# Patient Record
Sex: FEMALE | Race: WHITE | NOT HISPANIC OR LATINO | Employment: UNEMPLOYED | URBAN - METROPOLITAN AREA
[De-identification: names, ages, dates, MRNs, and addresses within clinical notes are randomized per-mention and may not be internally consistent; named-entity substitution may affect disease eponyms.]

---

## 2017-09-18 ENCOUNTER — ALLSCRIPTS OFFICE VISIT (OUTPATIENT)
Dept: OTHER | Facility: OTHER | Age: 37
End: 2017-09-18

## 2017-09-18 DIAGNOSIS — N20.0 CALCULUS OF KIDNEY: ICD-10-CM

## 2017-09-18 LAB
BILIRUB UR QL STRIP: NORMAL
CLARITY UR: NORMAL
COLOR UR: YELLOW
GLUCOSE (HISTORICAL): NORMAL
HGB UR QL STRIP.AUTO: NORMAL
KETONES UR STRIP-MCNC: NORMAL MG/DL
LEUKOCYTE ESTERASE UR QL STRIP: NORMAL
NITRITE UR QL STRIP: NORMAL
PH UR STRIP.AUTO: 7 [PH]
PROT UR STRIP-MCNC: NORMAL MG/DL
SP GR UR STRIP.AUTO: 1.02
UROBILINOGEN UR QL STRIP.AUTO: 0.2

## 2017-09-27 ENCOUNTER — HOSPITAL ENCOUNTER (OUTPATIENT)
Dept: ULTRASOUND IMAGING | Facility: HOSPITAL | Age: 37
Discharge: HOME | End: 2017-09-27
Attending: UROLOGY
Payer: COMMERCIAL

## 2017-09-27 DIAGNOSIS — N20.0 CALCULUS OF KIDNEY: ICD-10-CM

## 2017-09-27 PROCEDURE — 76770 US EXAM ABDO BACK WALL COMP: CPT

## 2018-01-14 VITALS
SYSTOLIC BLOOD PRESSURE: 100 MMHG | HEIGHT: 64 IN | DIASTOLIC BLOOD PRESSURE: 62 MMHG | BODY MASS INDEX: 26.12 KG/M2 | WEIGHT: 153 LBS

## 2018-09-18 ENCOUNTER — HOSPITAL ENCOUNTER (OUTPATIENT)
Dept: RADIOLOGY | Facility: HOSPITAL | Age: 38
Discharge: HOME | End: 2018-09-18
Attending: UROLOGY
Payer: COMMERCIAL

## 2018-09-18 DIAGNOSIS — N20.0 CALCULUS OF KIDNEY: ICD-10-CM

## 2018-09-18 PROCEDURE — 74018 RADEX ABDOMEN 1 VIEW: CPT

## 2018-09-21 ENCOUNTER — OFFICE VISIT (OUTPATIENT)
Dept: UROLOGY | Facility: MEDICAL CENTER | Age: 38
End: 2018-09-21
Payer: COMMERCIAL

## 2018-09-21 VITALS
WEIGHT: 156 LBS | HEIGHT: 64 IN | DIASTOLIC BLOOD PRESSURE: 60 MMHG | SYSTOLIC BLOOD PRESSURE: 100 MMHG | BODY MASS INDEX: 26.63 KG/M2

## 2018-09-21 DIAGNOSIS — N20.0 CALCULUS OF KIDNEY: Primary | ICD-10-CM

## 2018-09-21 LAB
SL AMB  POCT GLUCOSE, UA: ABNORMAL
SL AMB LEUKOCYTE ESTERASE,UA: ABNORMAL
SL AMB POCT BILIRUBIN,UA: ABNORMAL
SL AMB POCT BLOOD,UA: ABNORMAL
SL AMB POCT CLARITY,UA: CLEAR
SL AMB POCT COLOR,UA: YELLOW
SL AMB POCT KETONES,UA: ABNORMAL
SL AMB POCT NITRITE,UA: ABNORMAL
SL AMB POCT PH,UA: 7
SL AMB POCT SPECIFIC GRAVITY,UA: 1.02
SL AMB POCT URINE PROTEIN: ABNORMAL
SL AMB POCT UROBILINOGEN: 0.2

## 2018-09-21 PROCEDURE — 81003 URINALYSIS AUTO W/O SCOPE: CPT | Performed by: UROLOGY

## 2018-09-21 PROCEDURE — 99213 OFFICE O/P EST LOW 20 MIN: CPT | Performed by: UROLOGY

## 2018-09-21 NOTE — LETTER
September 21, 2018     Raulito Clifford, [address] [facility] [address]    Patient: Barbara Guzman   YOB: 1980   Date of Visit: 9/21/2018       Dear Dr Jose Martin Perez: Thank you for referring Harini Sanchez to me for evaluation  Below are my notes for this consultation  If you have questions, please do not hesitate to call me  I look forward to following your patient along with you  Sincerely,        Jericho Haskins MD        CC: No Recipients  Jericho Haskins MD  9/21/2018 10:44 AM  Sign at close encounter  Assessment/Plan:    Calculus of kidney  KUB is reviewed on the [facility] system  No right renal or ureteral stones are seen  A small stone is seen in the left mid pole calyx  Treatment options were discussed and she has elected continued observation of this stone  We did review dietary measures for stone prevention and continued increased fluid intake was encouraged  She will return in 1 year  We will consider further radiologic studies depending on her symptoms at that time  Diagnoses and all orders for this visit:    Calculus of kidney  -     POCT urine dip auto non-scope          Subjective:      Patient ID: Barbara Guzman is a 44 y o  female  Chief complaint:  Kidney stones    HPI:  50-year-old female with a long history of kidney stones  She has had a extracorporeal shockwave lithotripsy and ureteroscopy in the past   She last passed a stone in 2014  She reports she is voiding well  There is no gross hematuria, dysuria or symptoms of infection  She has occasional episodes of intermittent right flank discomfort but nothing severe  She is not sure these are stone related  She returns today for follow-up          The following portions of the patient's history were reviewed and updated as appropriate: allergies, current medications, past family history, past medical history, past social history, past surgical history and problem list     Review of Systems Constitutional: Negative for activity change, appetite change, fatigue and fever  HENT: Negative  Eyes: Negative  Respiratory: Negative  Cardiovascular: Negative  Gastrointestinal: Negative for blood in stool, constipation, diarrhea and vomiting  Endocrine: Negative  Musculoskeletal: Negative  Skin: Negative  Allergic/Immunologic: Negative  Neurological: Negative  Hematological: Negative  Psychiatric/Behavioral: Negative  Objective:      /60 (BP Location: Left arm, Patient Position: Sitting)   Ht 5' 4" (1 626 m)   Wt 70 8 kg (156 lb)   BMI 26 78 kg/m²           Physical Exam   Constitutional: She is oriented to person, place, and time  She appears well-developed and well-nourished  HENT:   Head: Normocephalic and atraumatic  Eyes: Conjunctivae are normal    Neck: Neck supple  Cardiovascular: Normal rate  Pulmonary/Chest: Effort normal    Abdominal: Soft  She exhibits no distension and no mass  There is no tenderness  There is no rebound, no guarding and no CVA tenderness  Musculoskeletal: She exhibits no edema  No CVA tenderness   Neurological: She is alert and oriented to person, place, and time  Skin: Skin is warm and dry  Psychiatric: She has a normal mood and affect   Her behavior is normal  Judgment and thought content normal

## 2018-09-21 NOTE — PATIENT INSTRUCTIONS
Low Oxalate Diet   WHAT YOU NEED TO KNOW:   A low-oxalate diet is a meal plan that is low in oxalate  Oxalate is a chemical found in plant foods  You may need to eat foods that are low in oxalate to help clear kidney stones or prevent them from forming  People who have had kidney stones are at a higher risk of forming kidney stones again  The most common type of kidney stone is made up of crystals that contain calcium and oxalate  Your healthcare provider or dietitian may recommend that you limit oxalate if you get this type of kidney stone often  DISCHARGE INSTRUCTIONS:   Foods to include: Include the following foods that have a low to medium amount of oxalate    · Grains:      ¨ Egg noodles    ¨ Hamlet crackers    ¨ Pancakes and waffles    ¨ Cooked and dry cereals without nuts or bran    ¨ White or wild rice    ¨ White bread, cornbread, bagels, and white English muffins (medium oxalate)    ¨ Saltine or soda crackers and vanilla wafers (medium oxalate)    ¨ Brown rice, spaghetti, and other noodles and pastas (medium oxalate)    · Fruit:      ¨ Apples, bananas, grapes    ¨ Grapefruit and cranberries    ¨ Peaches, nectarines, apricots, and pears    ¨ Papayas and strawberries    ¨ Melons and pineapples    ¨ Blackberries, blueberries, mangoes, and prunes (medium oxalate)    · Vegetables:      ¨ Artichokes, asparagus, and brussels sprouts    ¨ Broccoli and cauliflower    ¨ Kale, endive, cabbage, and lettuce    ¨ Cucumbers, peas, and zucchini    ¨ Mushrooms, onions, and peppers    ¨ Potatoes and corn    ¨ Carrots, celery, and green beans (medium oxalate)    ¨ Parsnips, summer squash, tomatoes, and turnips (medium oxalate)    · Dairy:      ¨ American cheese, Swiss cheese, cottage cheese, ricotta cheese, and cheddar cheese    ¨ Milk and buttermilk    ¨ Yogurt    · Protein foods:      ¨ Meat, fish, shellfish, chicken, and turkey    [place] meat and ham (medium oxalate)    ¨ Hot dogs, bratwurst, ervin, and sausage (medium oxalate)    · Drinks and desserts:      ¨ Coffee    ¨ Fruit punch and lemonade or limeade without added vitamin C    · Desserts:      ¨ Cookies, cakes, and ice cream    ¨ Pudding without chocolate  Foods to limit or avoid:  Limit the following foods that are high in oxalate  · Grains:      ¨ Wheat bran, wheat germ, and barley    ¨ Grits and bran cereal    ¨ White corn flour and buckwheat flour    ¨ Whole wheat bread    · Fruit:      ¨ Dried apricots    ¨ Red currants, figs, and rhubarb    ¨ Kiwi    · Vegetables:      ¨ Dariusz greens, leeks, okra, and spinach    ¨ Wax beans     ¨ Eggplant    ¨ Beets and beet greens    ¨ Swiss chard, escarole, parsley, and rutabagas    ¨ Tomato paste    · Protein foods:      ¨ Baked beans with tomato sauce    ¨ Nut butters and nuts (peanuts, almonds, pecans, cashews, hazelnuts)    ¨ Soy burgers    ¨ Miso    ¨ Dried beans    · Desserts:      ¨ Fruitcake    ¨ Chocolate    ¨ Carob and marmalade    · Beverages:      ¨ Chocolate drink mixes    ¨ Soy milk    ¨ Instant iced tea    · Other foods:      ¨ Sesame seeds and tahini (paste made of sesame seeds)    ¨ Poppy seeds  Other dietary guidelines:   · Drink about 12 to 16 (eight-ounce) cups of liquid each day  Liquids help clear kidney stones and prevent them from forming again  Water is the best liquid to drink  You may need more liquid if you are physically active  Ask your healthcare provider or dietitian how much liquid you need to drink each day  · Your healthcare provider may suggest that you make other diet changes to help prevent kidney stones  This may include decreasing the amount of sodium you eat each day  © 2017 [facility] Information is for End User's use only and may not be sold, redistributed or otherwise used for commercial purposes  All illustrations and images included in CareNotes® are the copyrighted property of [employer] M  [employer]  or [place]    The above information is an educational aid only  It is not intended as medical advice for individual conditions or treatments  Talk to your doctor, nurse or pharmacist before following any medical regimen to see if it is safe and effective for you

## 2018-09-21 NOTE — PROGRESS NOTES
Assessment/Plan:    Calculus of kidney  KUB is reviewed on the [facility] system  No right renal or ureteral stones are seen  A small stone is seen in the left mid pole calyx  Treatment options were discussed and she has elected continued observation of this stone  We did review dietary measures for stone prevention and continued increased fluid intake was encouraged  She will return in 1 year  We will consider further radiologic studies depending on her symptoms at that time  Diagnoses and all orders for this visit:    Calculus of kidney  -     POCT urine dip auto non-scope          Subjective:      Patient ID: Effie Westfall is a 44 y o  female  Chief complaint:  Kidney stones    HPI:  43-year-old female with a long history of kidney stones  She has had a extracorporeal shockwave lithotripsy and ureteroscopy in the past   She last passed a stone in 2014  She reports she is voiding well  There is no gross hematuria, dysuria or symptoms of infection  She has occasional episodes of intermittent right flank discomfort but nothing severe  She is not sure these are stone related  She returns today for follow-up  The following portions of the patient's history were reviewed and updated as appropriate: allergies, current medications, past family history, past medical history, past social history, past surgical history and problem list     Review of Systems   Constitutional: Negative for activity change, appetite change, fatigue and fever  HENT: Negative  Eyes: Negative  Respiratory: Negative  Cardiovascular: Negative  Gastrointestinal: Negative for blood in stool, constipation, diarrhea and vomiting  Endocrine: Negative  Musculoskeletal: Negative  Skin: Negative  Allergic/Immunologic: Negative  Neurological: Negative  Hematological: Negative  Psychiatric/Behavioral: Negative            Objective:      /60 (BP Location: Left arm, Patient Position: Sitting)   Ht 5' 4" (1 626 m)   Wt 70 8 kg (156 lb)   BMI 26 78 kg/m²          Physical Exam   Constitutional: She is oriented to person, place, and time  She appears well-developed and well-nourished  HENT:   Head: Normocephalic and atraumatic  Eyes: Conjunctivae are normal    Neck: Neck supple  Cardiovascular: Normal rate  Pulmonary/Chest: Effort normal    Abdominal: Soft  She exhibits no distension and no mass  There is no tenderness  There is no rebound, no guarding and no CVA tenderness  Musculoskeletal: She exhibits no edema  No CVA tenderness   Neurological: She is alert and oriented to person, place, and time  Skin: Skin is warm and dry  Psychiatric: She has a normal mood and affect   Her behavior is normal  Judgment and thought content normal

## 2018-09-21 NOTE — ASSESSMENT & PLAN NOTE
KUB is reviewed on the [facility] system  No right renal or ureteral stones are seen  A small stone is seen in the left mid pole calyx  Treatment options were discussed and she has elected continued observation of this stone  We did review dietary measures for stone prevention and continued increased fluid intake was encouraged  She will return in 1 year  We will consider further radiologic studies depending on her symptoms at that time

## 2018-10-17 ENCOUNTER — OFFICE VISIT (OUTPATIENT)
Dept: URGENT CARE | Age: 38
End: 2018-10-17
Payer: COMMERCIAL

## 2018-10-17 VITALS
OXYGEN SATURATION: 98 % | HEART RATE: 88 BPM | SYSTOLIC BLOOD PRESSURE: 105 MMHG | HEIGHT: 64 IN | RESPIRATION RATE: 16 BRPM | TEMPERATURE: 98.9 F | BODY MASS INDEX: 26.46 KG/M2 | WEIGHT: 155 LBS | DIASTOLIC BLOOD PRESSURE: 58 MMHG

## 2018-10-17 DIAGNOSIS — J06.9 UPPER RESPIRATORY TRACT INFECTION, UNSPECIFIED TYPE: Primary | ICD-10-CM

## 2018-10-17 PROCEDURE — 99213 OFFICE O/P EST LOW 20 MIN: CPT | Performed by: PHYSICIAN ASSISTANT

## 2018-10-17 RX ORDER — FLUTICASONE PROPIONATE 50 MCG
2 SPRAY, SUSPENSION (ML) NASAL DAILY
Qty: 16 G | Refills: 0 | Status: ON HOLD | OUTPATIENT
Start: 2018-10-17 | End: 2020-04-16 | Stop reason: ALTCHOICE

## 2018-10-17 RX ORDER — BENZONATATE 100 MG/1
100 CAPSULE ORAL 3 TIMES DAILY PRN
Qty: 15 CAPSULE | Refills: 0 | Status: SHIPPED | OUTPATIENT
Start: 2018-10-17 | End: 2019-09-25 | Stop reason: ALTCHOICE

## 2018-10-17 RX ORDER — ALBUTEROL SULFATE 90 UG/1
2 AEROSOL, METERED RESPIRATORY (INHALATION) EVERY 6 HOURS PRN
Qty: 1 INHALER | Refills: 0 | Status: SHIPPED | OUTPATIENT
Start: 2018-10-17 | End: 2019-09-25 | Stop reason: ALTCHOICE

## 2018-10-17 NOTE — PROGRESS NOTES
[address] [facility]        NAME: Teresa Ordaz is a 44 y o  female  : 1980    MRN: [id number]  DATE: 2018  TIME: 8:03 PM    Assessment and Plan   Upper respiratory tract infection, unspecified type [J06 9]  1  Upper respiratory tract infection, unspecified type  fluticasone (FLONASE) 50 mcg/act nasal spray    benzonatate (TESSALON PERLES) 100 mg capsule    albuterol (PROVENTIL HFA,VENTOLIN HFA) 90 mcg/act inhaler         Patient Instructions     Motrin and/or Tylenol as needed for pain control or fevers  Use medications as directed  Stop smoking  Follow up with PCP in 3-5 days  Proceed to  ER if symptoms worsen  Chief Complaint     Chief Complaint   Patient presents with    Cough     Pt c/o cough since yesterday with some nasal congestion  History of Present Illness       53-year-old female presents with 1 day history of upper respiratory symptoms  Patient reports she has been having sore throats cough runny nose congestion  Denies any fevers chills nausea vomiting diarrhea  Reports that her  was also recently sick  URI    This is a new problem  The current episode started yesterday  The problem has been waxing and waning  There has been no fever  Associated symptoms include congestion, coughing and rhinorrhea  Pertinent negatives include no abdominal pain, nausea, rash, sneezing, sore throat or wheezing  She has tried nothing for the symptoms  The treatment provided no relief  Review of Systems   Review of Systems   Constitutional: Negative  HENT: Positive for congestion and rhinorrhea  Negative for sneezing and sore throat  Eyes: Negative  Respiratory: Positive for cough  Negative for wheezing  Cardiovascular: Negative  Gastrointestinal: Negative  Negative for abdominal pain and nausea  Musculoskeletal: Negative  Skin: Negative  Negative for rash  Neurological: Negative            Current Medications       Current Outpatient Prescriptions:     albuterol (PROVENTIL HFA,VENTOLIN HFA) 90 mcg/act inhaler, Inhale 2 puffs every 6 (six) hours as needed for wheezing, Disp: 1 Inhaler, Rfl: 0    benzonatate (TESSALON PERLES) 100 mg capsule, Take 1 capsule (100 mg total) by mouth 3 (three) times a day as needed for cough, Disp: 15 capsule, Rfl: 0    fluticasone (FLONASE) 50 mcg/act nasal spray, 2 sprays into each nostril daily, Disp: 16 g, Rfl: 0    Current Allergies     Allergies as of 10/17/2018 - Reviewed 10/17/2018   Allergen Reaction Noted    Hydrocodone-acetaminophen Hives             The following portions of the patient's history were reviewed and updated as appropriate: allergies, current medications, past family history, past medical history, past social history, past surgical history and problem list      Past Medical History:   Diagnosis Date    Abdominal pain     Distorted vision     Feeling of incomplete bladder emptying     Kidney stone     Nocturia     Stress incontinence of urine     Urinary frequency     Weak urinary stream        Past Surgical History:   Procedure Laterality Date    APPENDECTOMY       SECTION  ,     CYSTOSCOPY W/ RETROGRADES Right 2012    CYSTOSCOPY W/ URETERAL STENT REMOVAL Right     CYSTOSCOPY W/ URETEROSCOPY W/ LITHOTRIPSY Right        Family History   Problem Relation Age of Onset    Cancer Family     Heart attack Family     Nephrolithiasis Family     Nephrolithiasis Family          Medications have been verified  Objective   /58   Pulse 88   Temp 98 9 °F (37 2 °C) (Tympanic)   Resp 16   Ht 5' 4" (1 626 m)   Wt 70 3 kg (155 lb)   LMP 2018   SpO2 98%   BMI 26 61 kg/m²        Physical Exam     Physical Exam   Constitutional: She is oriented to person, place, and time  She appears well-developed and well-nourished  No distress  HENT:   Head: Normocephalic and atraumatic     Right Ear: External ear normal    Left Ear: External ear normal    Nose: Nose normal    Mouth/Throat: Posterior oropharyngeal erythema (Mild) present  No oropharyngeal exudate  Eyes: Conjunctivae are normal  Right eye exhibits no discharge  Left eye exhibits no discharge  Neck: Normal range of motion  Neck supple  Cardiovascular: Normal rate, regular rhythm, normal heart sounds and intact distal pulses  No murmur heard  Pulmonary/Chest: Effort normal and breath sounds normal  No respiratory distress  She has no wheezes  She has no rales  Abdominal: Soft  Bowel sounds are normal  There is no tenderness  Musculoskeletal: Normal range of motion  Lymphadenopathy:     She has no cervical adenopathy  Neurological: She is alert and oriented to person, place, and time  Skin: Skin is warm and dry  Psychiatric: She has a normal mood and affect  Nursing note and vitals reviewed

## 2018-10-17 NOTE — PATIENT INSTRUCTIONS
Motrin and/or Tylenol as needed for pain control or fevers  Use medications as directed  Stop smoking  Follow up with PCP in 3-5 days  Proceed to  ER if symptoms worsen  Cold Symptoms   AMBULATORY CARE:   Cold symptoms  include sneezing, dry throat, a stuffy nose, headache, watery eyes, and a cough  Your cough may be dry, or you may cough up mucus  You may also have muscle aches, joint pain, and tiredness  Rarely, you may have a fever  Cold symptoms occur from inflammation in your upper respiratory system caused by a virus  Most colds go away without treatment  Seek care immediately if:   · You have increased tiredness and weakness  · You are unable to eat  · Your heart is beating much faster than usual for you  · You see white spots in the back of your throat and your neck is swollen and sore to the touch  · You see pinpoint or larger reddish-purple dots on your skin  Contact your healthcare provider if:   · You have a fever higher than 102°F (38 9°C)  · You have new or worsening shortness of breath  · You have thick nasal drainage for more than 2 days  · Your symptoms do not improve or get worse within 5 days  · You have questions or concerns about your condition or care  Treatment for cold symptoms  may include NSAIDS to decrease muscle aches and fever  Cold medicines may also be given to decrease coughing, nasal stuffiness, sneezing, and a runny nose  Manage your cold symptoms: The following may help relieve cold symptoms, such as a dry throat and congestion:  · Gargle with mouthwash or warm salt water as directed  · Suck on throat lozenges or hard candy  · Use a cold or warm vaporizer or humidifier to ease your breathing  · Rest for at least 2 days and then as needed to decrease tiredness and weakness  · Use petroleum based jelly around your nostrils to decrease irritation from blowing your nose  · Drink plenty of liquids   Liquids will help thin and loosen thick mucus so you can cough it up  Liquids will also keep you hydrated  Ask your healthcare provider which liquids are best for you and how much to drink each day  Prevent the spread of germs  by washing your hands often  You can spread your cold germs to others for at least 3 days after your symptoms start  Do not share items, such as eating utensils  Cover your nose and mouth when you cough or sneeze using the crook of your elbow instead of your hands  Throw used tissues in the garbage  Do not smoke:  Smoking may worsen your symptoms and increase the length of time you feel sick  Talk with your healthcare provider if you need help to stop smoking  Follow up with your healthcare provider as directed:  Write down your questions so you remember to ask them during your visits  © 2017 [address] [employer] Information is for End User's use only and may not be sold, redistributed or otherwise used for commercial purposes  All illustrations and images included in CareNotes® are the copyrighted property of [employer]  or [place]  The above information is an  only  It is not intended as medical advice for individual conditions or treatments  Talk to your doctor, nurse or pharmacist before following any medical regimen to see if it is safe and effective for you

## 2019-08-28 ENCOUNTER — TRANSCRIBE ORDERS (OUTPATIENT)
Dept: ADMINISTRATIVE | Facility: HOSPITAL | Age: 39
End: 2019-08-28

## 2019-08-28 DIAGNOSIS — K82.9 DISEASE OF GALLBLADDER: Primary | ICD-10-CM

## 2019-08-29 ENCOUNTER — HOSPITAL ENCOUNTER (OUTPATIENT)
Dept: RADIOLOGY | Facility: IMAGING CENTER | Age: 39
Discharge: HOME | End: 2019-08-29
Payer: COMMERCIAL

## 2019-08-29 DIAGNOSIS — K82.9 DISEASE OF GALLBLADDER: ICD-10-CM

## 2019-08-29 PROCEDURE — 76705 ECHO EXAM OF ABDOMEN: CPT

## 2019-09-23 ENCOUNTER — TELEPHONE (OUTPATIENT)
Dept: UROLOGY | Facility: CLINIC | Age: 39
End: 2019-09-23

## 2019-09-23 DIAGNOSIS — N20.0 NEPHROLITHIASIS: Primary | ICD-10-CM

## 2019-09-23 NOTE — TELEPHONE ENCOUNTER
The patient is being seen in follow-up for nephrolithiasis 9/25/2019  No imaging ordered  Please inquire to see if the patient would like to have an x-ray prior to her visit to check on her stone burden  If this is the case, she should have this scheduled and follow up after obtained

## 2019-09-23 NOTE — TELEPHONE ENCOUNTER
LMOM for pt to return call  To inquire if pt would imaging done prior to 9/25 appoint or if that should be determined after evaluation on 9/25

## 2019-09-23 NOTE — PROGRESS NOTES
2019      Chief Complaint   Patient presents with    Nephrolithiasis       Assessment and Plan    43 y o  female managed by Dr Rei Oquendo    1  Nephrolithiasis  - KUB 18 revealed 5 mm left kidney midpole stone, and a 4 mm left kidney lower pole stone  - discussed with the patient that when patients have stone burden would like to obtain yearly imaging, she defers an imaging study at this time    Will follow up in 1 year with a KUB prior      History of Present Illness  Wendi Francois is a 43 y o  female here for follow up evaluation of nephrolithiasis  The patient's last x-ray was 1 year ago and revealed two kidney stones  There was a 5 millimeter left mid pole stone and a 4 millimeter left lower pole stone  She has been doing well since her visit last year  She denies any stone passage or symptoms of obstruction  She has no urinary complaints  Review of Systems   Constitutional: Negative for activity change, chills and fever  Gastrointestinal: Negative for abdominal distention and abdominal pain  Musculoskeletal: Negative for back pain and gait problem  Psychiatric/Behavioral: Negative for behavioral problems and confusion         Past Medical History  Past Medical History:   Diagnosis Date    Abdominal pain     Distorted vision     Feeling of incomplete bladder emptying     Kidney stone     Nocturia     Stress incontinence of urine     Urinary frequency     Weak urinary stream        Past Social History  Past Surgical History:   Procedure Laterality Date    APPENDECTOMY       SECTION  ,     CYSTOSCOPY W/ RETROGRADES Right     CYSTOSCOPY W/ URETERAL STENT REMOVAL Right     CYSTOSCOPY W/ URETEROSCOPY W/ LITHOTRIPSY Right      Social History     Tobacco Use   Smoking Status Current Every Day Smoker   Smokeless Tobacco Never Used       Past Family History  Family History   Problem Relation Age of Onset    Cancer Family     Heart attack Family  Nephrolithiasis Family     Nephrolithiasis Family        Past Social history  Social History     Socioeconomic History    Marital status: /Civil Union     Spouse name: Not on file    Number of children: Not on file    Years of education: Not on file    Highest education level: Not on file   Occupational History    Occupation: Retail     Comment: Part-time   Social Needs    Financial resource strain: Not on file    Food insecurity:     Worry: Not on file     Inability: Not on file   [employer] needs:     Medical: Not on file     Non-medical: Not on file   Tobacco Use    Smoking status: Current Every Day Smoker    Smokeless tobacco: Never Used   Substance and Sexual Activity    Alcohol use: No    Drug use: No    Sexual activity: Not on file   Lifestyle    Physical activity:     Days per week: Not on file     Minutes per session: Not on file    Stress: Not on file   Relationships    Social connections:     Talks on phone: Not on file     Gets together: Not on file     Attends Taoist service: Not on file     Active member of club or organization: Not on file     Attends meetings of clubs or organizations: Not on file     Relationship status: Not on file    Intimate partner violence:     Fear of current or ex partner: Not on file     Emotionally abused: Not on file     Physically abused: Not on file     Forced sexual activity: Not on file   Other Topics Concern    Not on file   Social History Narrative    Not on file       Current Medications  Current Outpatient Medications   Medication Sig Dispense Refill    fluticasone (FLONASE) 50 mcg/act nasal spray 2 sprays into each nostril daily (Patient not taking: Reported on 9/25/2019) 16 g 0     No current facility-administered medications for this visit          Allergies  Allergies   Allergen Reactions    Hydrocodone-Acetaminophen Hives     hives (tolerates tylenol w/ codeine)         The following portions of the patient's history were reviewed and updated as appropriate: allergies, current medications, past medical history, past social history, past surgical history and problem list       Vitals  Vitals:    09/25/19 0929   BP: 90/60   Pulse: 88   Weight: 69 9 kg (154 lb)   Height: 5' 4" (1 626 m)           Physical Exam  Constitutional   General appearance: Patient is seated and in no acute distress, well appearing and well nourished  Head and Face   Head and face: Normal     Eyes   Conjunctiva and lids: No erythema, swelling or discharge  Ears, Nose, Mouth, and Throat   Hearing: Normal     Pulmonary   Respiratory effort: No increased work of breathing or signs of respiratory distress  Cardiovascular   Examination of extremities for edema and/or varicosities: Normal     Abdomen   Abdomen: Non-tender, no masses  Musculoskeletal   Gait and station: normal     Skin   Skin and subcutaneous tissue: Warm, dry, and intact  No visible lesions or rashes  Psychiatric   Judgment and insight: Normal  Recent and remote memory:  Normal  Mood and affect: Normal      Results  No results found for this or any previous visit (from the past 1 hour(s))  ]  No results found for: PSA  No results found for: GLUCOSE, CALCIUM, NA, K, CO2, CL, BUN, CREATININE  No results found for: WBC, HGB, HCT, MCV, PLT      Orders  No orders of the defined types were placed in this encounter

## 2019-09-24 NOTE — TELEPHONE ENCOUNTER
Patient is calling back to say she does not feel she needs  to have xray prior to visit   Will keep her appointment as scheduled for 9/25

## 2019-09-25 ENCOUNTER — OFFICE VISIT (OUTPATIENT)
Dept: UROLOGY | Facility: MEDICAL CENTER | Age: 39
End: 2019-09-25
Payer: COMMERCIAL

## 2019-09-25 VITALS
WEIGHT: 154 LBS | HEART RATE: 88 BPM | SYSTOLIC BLOOD PRESSURE: 90 MMHG | DIASTOLIC BLOOD PRESSURE: 60 MMHG | HEIGHT: 64 IN | BODY MASS INDEX: 26.29 KG/M2

## 2019-09-25 DIAGNOSIS — N20.0 CALCULUS OF KIDNEY: Primary | ICD-10-CM

## 2019-09-25 PROCEDURE — 99213 OFFICE O/P EST LOW 20 MIN: CPT | Performed by: PHYSICIAN ASSISTANT

## 2019-10-22 ENCOUNTER — TELEPHONE (OUTPATIENT)
Dept: UROLOGY | Facility: MEDICAL CENTER | Age: 39
End: 2019-10-22

## 2019-10-22 NOTE — TELEPHONE ENCOUNTER
Patient of Dr Elizabeth Crowe seen in the [facility] office  Patient advised that she passed kidney stone fragments  Patient advised that she is not in pain anymore since it passed she also had a little bit of blood in her urine  Patient just wanted to make doctor aware  Please advise

## 2019-10-23 DIAGNOSIS — N20.0 CALCULUS OF KIDNEY: Primary | ICD-10-CM

## 2019-10-23 NOTE — TELEPHONE ENCOUNTER
Called patient - She is told that Dr Pao Carnes wants to have an ultrasound of the kidneys done sometimes next month  Order is for the U/S retroperitoneal Limited

## 2019-10-23 NOTE — TELEPHONE ENCOUNTER
Lets get a renal ultrasound to ensure there is no hydro and check stone status  Can be done in the next month

## 2019-10-24 NOTE — TELEPHONE ENCOUNTER
LUCIANO with central scheduling number for patient to call and schedule directly at her convenience

## 2020-03-23 ENCOUNTER — TELEPHONE (OUTPATIENT)
Dept: UROLOGY | Facility: MEDICAL CENTER | Age: 40
End: 2020-03-23

## 2020-03-23 NOTE — TELEPHONE ENCOUNTER
Call placed to patient and spoke with her in regards to recent ER visit  Patient states that she is currently experiencing 7/10 pain, but it is not overwhelming at this time  Spoke with Dr Estefany Mccoy who advised that patient can come into the office tomorrow for appointment with him to develop plan of care  Patient requesting a phone call from doctor to review CT scan and further plan of care rather than coming into the office at this time   Informed patient that I will reach out to Dr Estefany Mccoy in regards to this request

## 2020-03-23 NOTE — TELEPHONE ENCOUNTER
Patient of Dr Panchal [place] seen in the [facility] office  Patient called regard the ER visit secondary to a kidney stone  Patient can be reached [phone] to discuss  Thank you

## 2020-03-27 ENCOUNTER — OFFICE VISIT (OUTPATIENT)
Dept: UROLOGY | Facility: MEDICAL CENTER | Age: 40
End: 2020-03-27
Payer: COMMERCIAL

## 2020-03-27 VITALS — HEIGHT: 64 IN | BODY MASS INDEX: 26.29 KG/M2 | WEIGHT: 154 LBS

## 2020-03-27 DIAGNOSIS — N20.1 LEFT URETERAL STONE: Primary | ICD-10-CM

## 2020-03-27 DIAGNOSIS — N20.0 CALCULUS OF KIDNEY: ICD-10-CM

## 2020-03-27 PROCEDURE — G2012 BRIEF CHECK IN BY MD/QHP: HCPCS | Performed by: UROLOGY

## 2020-03-27 RX ORDER — IBUPROFEN 600 MG/1
600 TABLET ORAL EVERY 6 HOURS PRN
COMMUNITY
Start: 2020-03-22 | End: 2020-09-25 | Stop reason: ALTCHOICE

## 2020-03-27 RX ORDER — TAMSULOSIN HYDROCHLORIDE 0.4 MG/1
CAPSULE ORAL
COMMUNITY
Start: 2020-03-22 | End: 2020-04-07 | Stop reason: SDUPTHER

## 2020-03-27 RX ORDER — OXYCODONE HYDROCHLORIDE AND ACETAMINOPHEN 5; 325 MG/1; MG/1
1 TABLET ORAL EVERY 6 HOURS PRN
COMMUNITY
Start: 2020-03-22 | End: 2020-04-16 | Stop reason: HOSPADM

## 2020-03-27 RX ORDER — MEDROXYPROGESTERONE ACETATE 150 MG/ML
150 INJECTION, SUSPENSION INTRAMUSCULAR
COMMUNITY
Start: 2020-02-12

## 2020-03-27 NOTE — ASSESSMENT & PLAN NOTE
The patient has a 4 x 6 mm  left mid ureteral stone  She is afebrile and relatively comfortable  We did discuss treatment options  At this point she remains comfortable and is in favor of continued observation for spontaneous stone passage  She will keep her fluid intake high and strain her urine  She knows to call for fever, chills, nausea or vomiting  We will plan to obtain a KUB in 1 week to assess for stone progress  She will have a no other virtual visit at that time to see how she is doing and again discuss options

## 2020-03-27 NOTE — PATIENT INSTRUCTIONS
Kidney Stones   WHAT YOU NEED TO KNOW:   What is a kidney stone? Kidney stones form in the urinary system when the water and waste in your urine are out of balance  When this happens, certain types of waste crystals separate from the urine  The crystals build up and form kidney stones  Kidney stones can be made of uric acid, calcium, phosphate, or oxalate crystals  You may have 1 or more kidney stones  What increases my risk for kidney stones? · You do not drink enough liquids (especially water) each day  · You have urinary tract infections often  · You follow a certain type of diet  For example, people who eat a diet high in meat or salt may be at higher risk for kidney stones  People who eat foods high in oxalate may also be at higher risk  Foods that are high in oxalate include nuts, chocolate, coffee, and green leafy vegetables  · You take certain medicines such as diuretics, steroids, and antacids  · A family member has had kidney stones  · You were born with a kidney or bowel disorder, or you have other medical problems such as gout  What are the signs and symptoms of kidney stones? · Pain in the middle of your back that moves across to your side or that may spread to your groin    · Nausea and vomiting    · Urge to urinate often, burning feeling when you urinate, or pink or red urine    · Tenderness in your lower back, side, or stomach  How are kidney stones diagnosed? Your healthcare provider will ask about your health, diet, and lifestyle  He may refer you to a urologist  Tiffany Garcia may need tests to find out what type of kidney stones you have  Tests can show the size of your kidney stones and where they are in your urinary system  You may have one or more of the following:  · Urine tests  may show if you have blood in your urine  They may also show high amounts of the substances that form kidney stones, such as uric acid  · Blood tests  show how well your kidneys are working   They may also be used to check the levels of calcium or uric acid in your blood  · A noncontrast helical CT scan  is a type of x-ray that uses a computer to take pictures of your kidneys  Healthcare providers use the pictures to check for kidney stones or other problems  · X-rays  of your kidneys, bladder, and ureters may be done  You may be given a dye before the pictures are taken to help healthcare providers see the pictures better  You may need to have more than one x-ray  Tell the healthcare provider if you have ever had an allergic reaction to contrast dye  · An abdominal ultrasound  uses sound waves to show pictures of your abdomen on a monitor  How are kidney stones treated? · NSAIDs , such as ibuprofen, help decrease swelling, pain, and fever  This medicine is available with or without a doctor's order  NSAIDs can cause stomach bleeding or kidney problems in certain people  If you take blood thinner medicine, always ask your healthcare provider if NSAIDs are safe for you  Always read the medicine label and follow directions  · Prescription medicine  may be given  Ask how to take this medicine safely  · Medicines  to balance your electrolytes may be needed  · A procedure or surgery  to remove the kidney stones may be needed if they do not pass on their own  Your treatment will depend on the size and location of your kidney stones  How can I manage my symptoms? · Drink plenty of liquids  Your healthcare provider may tell you to drink at least 8 to 12 (eight-ounce) cups of liquids each day  This helps flush out the kidney stones when you urinate  Water is the best liquid to drink  · Strain your urine every time you go to the bathroom  Urinate through a strainer or a piece of thin cloth to catch the stones  Take the stones to your healthcare provider so they can be sent to the lab for tests  This will help your healthcare providers plan the best treatment for you             · Eat a variety of healthy foods  Healthy foods include fruits, vegetables, whole-grain breads, low-fat dairy products, beans, and fish  You may need to limit how much sodium (salt) or protein you eat  Ask for information about the best foods for you  · Exercise regularly  Your stones may pass more easily if you stay active  Ask about the best activities for you  When should I seek immediate care? · You have vomiting that is not relieved by medicine  When should I contact my healthcare provider? · You have a fever  · You have trouble passing urine  · You see blood in your urine  · You have severe pain  · You have any questions or concerns about your condition or care  CARE AGREEMENT:   You have the right to help plan your care  Learn about your health condition and how it may be treated  Discuss treatment options with your caregivers to decide what care you want to receive  You always have the right to refuse treatment  The above information is an  only  It is not intended as medical advice for individual conditions or treatments  Talk to your doctor, nurse or pharmacist before following any medical regimen to see if it is safe and effective for you  © 2017 [employer] Information is for End User's use only and may not be sold, redistributed or otherwise used for commercial purposes  All illustrations and images included in CareNotes® are the copyrighted property of A [employer] M [employer]  or [place]

## 2020-03-27 NOTE — ASSESSMENT & PLAN NOTE
Nonobstructing left renal stones are also noted  No stones are noted in the right kidney on CT scan

## 2020-03-27 NOTE — LETTER
March 27, 2020     Ximena Cardona, [address] [facility] [address]    Patient: Flavio Soulier   YOB: 1980   Date of Visit: 3/27/2020       Dear Dr Jhony Santana: Thank you for referring Florentino Pringle to me for evaluation  Below are my notes for this consultation  If you have questions, please do not hesitate to call me  I look forward to following your patient along with you  Sincerely,        Manuel Ash MD        CC: No Recipients  Manuel Ash MD  3/27/2020 10:58 AM  Sign at close encounter  Virtual Regular Visit    Problem List Items Addressed This Visit        Genitourinary    Calculus of kidney     Nonobstructing left renal stones are also noted  No stones are noted in the right kidney on CT scan  Relevant Medications    oxyCODONE-acetaminophen (PERCOCET) 5-325 mg per tablet    Left ureteral stone - Primary     The patient has a 4 x 6 mm  left mid ureteral stone  She is afebrile and relatively comfortable  We did discuss treatment options  At this point she remains comfortable and is in favor of continued observation for spontaneous stone passage  She will keep her fluid intake high and strain her urine  She knows to call for fever, chills, nausea or vomiting  We will plan to obtain a KUB in 1 week to assess for stone progress  She will have a no other virtual visit at that time to see how she is doing and again discuss options  Relevant Medications    oxyCODONE-acetaminophen (PERCOCET) 5-325 mg per tablet    Other Relevant Orders    XR abdomen 1 view kub               Reason for visit is left ureteral stone  The visit is being done by telephone in light of the COVID-19 pandemic      Encounter provider Manuel Ash MD    Provider located at [address] [place] [address]    Recent Visits  Date Type Provider Dept   03/23/20 Telephone [id number] [address] [facility] [place]   Showing recent visits within past 7 days and meeting all other requirements     Today's Visits  Date Type Provider Dept   20 Office Visit Nithya Schaffer MD [facility] [place]   Showing today's visits and meeting all other requirements     Future Appointments  No visits were found meeting these conditions  Showing future appointments within next 150 days and meeting all other requirements        After connecting through [employer], the patient was identified by name and date of birth  Amado Valencia was informed that this is a telemedicine visit and that the visit is being conducted through telephone which may not be secure and therefore, might not be HIPAA-compliant  My office door was closed  No one else was in the room  She acknowledged consent and understanding of privacy and security of the video platform  The patient has agreed to participate and understands they can discontinue the visit at any time  Heather Valencia is a 35 y o  female with a left ureteral stone  She started having pain in her back in the beginning of March  She presented [facility] emergency room at [facility] on  with severe pain  A CT scan reveals a 4 x 6 mm left ureteral stone at the level of L4-5  She was given tamsulosin and pain medication  Since her visit she has been comfortable  She continues to have some discomfort but at the 3-4 level  She is not using ibuprofen  She has no nausea or vomiting  There is no fever  She is voiding adequately without dysuria  She did recently pass a few small clots  She has been straining her urine        Past Medical History:   Diagnosis Date    Abdominal pain     Distorted vision     Feeling of incomplete bladder emptying     Kidney stone     Nocturia     Stress incontinence of urine     Urinary frequency     Weak urinary stream        Past Surgical History:   Procedure Laterality Date    APPENDECTOMY       SECTION  ,     CYSTOSCOPY W/ RETROGRADES Right 2012    CYSTOSCOPY W/ URETERAL STENT REMOVAL Right 2012    CYSTOSCOPY W/ URETEROSCOPY W/ LITHOTRIPSY Right 2012       Current Outpatient Medications   Medication Sig Dispense Refill    medroxyPROGESTERone acetate (DEPO-PROVERA SYRINGE) 150 mg/mL injection Inject 150 mg into a muscle      oxyCODONE-acetaminophen (PERCOCET) 5-325 mg per tablet       tamsulosin (FLOMAX) 0 4 mg       fluticasone (FLONASE) 50 mcg/act nasal spray 2 sprays into each nostril daily (Patient not taking: Reported on 9/25/2019) 16 g 0    ibuprofen (MOTRIN) 600 mg tablet        No current facility-administered medications for this visit  Allergies   Allergen Reactions    Hydrocodone-Acetaminophen Hives     hives (tolerates tylenol w/ codeine)       Review of Systems   Constitutional: Negative for activity change, appetite change, chills, diaphoresis, fatigue, fever and unexpected weight change  HENT: Negative  Eyes: Negative  Respiratory: Negative  Cardiovascular: Negative  Gastrointestinal: Negative for blood in stool, constipation, diarrhea, nausea and vomiting  Endocrine: Negative  Genitourinary: Positive for flank pain  See HPI   Skin: Negative  Allergic/Immunologic: Negative  Neurological: Negative  Hematological: Negative  Psychiatric/Behavioral: Negative  I spent 20 minutes with the patient during this visit

## 2020-03-27 NOTE — PROGRESS NOTES
Virtual Regular Visit    Problem List Items Addressed This Visit        Genitourinary    Calculus of kidney     Nonobstructing left renal stones are also noted  No stones are noted in the right kidney on CT scan  Relevant Medications    oxyCODONE-acetaminophen (PERCOCET) 5-325 mg per tablet    Left ureteral stone - Primary     The patient has a 4 x 6 mm  left mid ureteral stone  She is afebrile and relatively comfortable  We did discuss treatment options  At this point she remains comfortable and is in favor of continued observation for spontaneous stone passage  She will keep her fluid intake high and strain her urine  She knows to call for fever, chills, nausea or vomiting  We will plan to obtain a KUB in 1 week to assess for stone progress  She will have a no other virtual visit at that time to see how she is doing and again discuss options  Relevant Medications    oxyCODONE-acetaminophen (PERCOCET) 5-325 mg per tablet    Other Relevant Orders    XR abdomen 1 view kub               Reason for visit is left ureteral stone  The visit is being done by telephone in light of the COVID-19 pandemic  Encounter provider Kaitlin Oh MD    Provider located at [address] [place] [address]    Recent Visits  Date Type Provider Dept   03/23/20 Telephone [address] [place] [address] For [facility]   Showing recent visits within past 7 days and meeting all other requirements     Today's Visits  Date Type Provider Dept   03/27/20 Office Visit Kaitlin Oh MD [facility]   Showing today's visits and meeting all other requirements     Future Appointments  No visits were found meeting these conditions  Showing future appointments within next 150 days and meeting all other requirements        After connecting through [employer], the patient was identified by name and date of birth   Barbra Hirsch was informed that this is a telemedicine visit and that the visit is being conducted through telephone which may not be secure and therefore, might not be HIPAA-compliant  My office door was closed  No one else was in the room  She acknowledged consent and understanding of privacy and security of the video platform  The patient has agreed to participate and understands they can discontinue the visit at any time  Subjective  Lou Herbert is a 35 y o  female with a left ureteral stone  She started having pain in her back in the beginning of March  She presented to the emergency room at [facility] on  with severe pain  A CT scan reveals a 4 x 6 mm left ureteral stone at the level of L4-5  She was given tamsulosin and pain medication  Since her visit she has been comfortable  She continues to have some discomfort but at the 3-4 level  She is not using ibuprofen  She has no nausea or vomiting  There is no fever  She is voiding adequately without dysuria  She did recently pass a few small clots  She has been straining her urine        Past Medical History:   Diagnosis Date    Abdominal pain     Distorted vision     Feeling of incomplete bladder emptying     Kidney stone     Nocturia     Stress incontinence of urine     Urinary frequency     Weak urinary stream        Past Surgical History:   Procedure Laterality Date    APPENDECTOMY  2008     SECTION  ,     CYSTOSCOPY W/ RETROGRADES Right     CYSTOSCOPY W/ URETERAL STENT REMOVAL Right 2012    CYSTOSCOPY W/ URETEROSCOPY W/ LITHOTRIPSY Right        Current Outpatient Medications   Medication Sig Dispense Refill    medroxyPROGESTERone acetate (DEPO-PROVERA SYRINGE) 150 mg/mL injection Inject 150 mg into a muscle      oxyCODONE-acetaminophen (PERCOCET) 5-325 mg per tablet       tamsulosin (FLOMAX) 0 4 mg       fluticasone (FLONASE) 50 mcg/act nasal spray 2 sprays into each nostril daily (Patient not taking: Reported on 2019) 16 g 0    ibuprofen (MOTRIN) 600 mg tablet        No current facility-administered medications for this visit  Allergies   Allergen Reactions    Hydrocodone-Acetaminophen Hives     hives (tolerates tylenol w/ codeine)       Review of Systems   Constitutional: Negative for activity change, appetite change, chills, diaphoresis, fatigue, fever and unexpected weight change  HENT: Negative  Eyes: Negative  Respiratory: Negative  Cardiovascular: Negative  Gastrointestinal: Negative for blood in stool, constipation, diarrhea, nausea and vomiting  Endocrine: Negative  Genitourinary: Positive for flank pain  See HPI   Skin: Negative  Allergic/Immunologic: Negative  Neurological: Negative  Hematological: Negative  Psychiatric/Behavioral: Negative  I spent 20 minutes with the patient during this visit

## 2020-04-02 NOTE — TELEPHONE ENCOUNTER
Called patient - LMOM that there is a order for an x-ray in [facility] and she can go to any facility to have this done  She is asked to call the office back if there are any problems  We will await the KUB results

## 2020-04-02 NOTE — TELEPHONE ENCOUNTER
Patient had a televisit last Friday and is having a kidney issue and also she was supposed to get a call about getting xray done and a new appointment scheduled and she said she has not heard from the office,  Please call her at [phone]

## 2020-04-04 ENCOUNTER — HOSPITAL ENCOUNTER (OUTPATIENT)
Dept: RADIOLOGY | Facility: HOSPITAL | Age: 40
Discharge: HOME | End: 2020-04-04
Attending: UROLOGY
Payer: COMMERCIAL

## 2020-04-04 DIAGNOSIS — N20.1 LEFT URETERAL STONE: ICD-10-CM

## 2020-04-04 PROCEDURE — 74018 RADEX ABDOMEN 1 VIEW: CPT

## 2020-04-07 ENCOUNTER — TELEPHONE (OUTPATIENT)
Dept: UROLOGY | Facility: MEDICAL CENTER | Age: 40
End: 2020-04-07

## 2020-04-07 ENCOUNTER — TELEMEDICINE (OUTPATIENT)
Dept: UROLOGY | Facility: MEDICAL CENTER | Age: 40
End: 2020-04-07
Payer: COMMERCIAL

## 2020-04-07 VITALS — WEIGHT: 154 LBS | HEIGHT: 64 IN | BODY MASS INDEX: 26.29 KG/M2

## 2020-04-07 DIAGNOSIS — N20.1 LEFT URETERAL STONE: Primary | ICD-10-CM

## 2020-04-07 PROCEDURE — 99213 OFFICE O/P EST LOW 20 MIN: CPT | Performed by: UROLOGY

## 2020-04-07 RX ORDER — TAMSULOSIN HYDROCHLORIDE 0.4 MG/1
0.4 CAPSULE ORAL
Qty: 30 CAPSULE | Refills: 0 | Status: ON HOLD | OUTPATIENT
Start: 2020-04-07 | End: 2020-04-16 | Stop reason: SDUPTHER

## 2020-04-07 RX ORDER — OXYCODONE HYDROCHLORIDE 5 MG/1
5 TABLET ORAL EVERY 6 HOURS PRN
Qty: 10 TABLET | Refills: 0 | Status: SHIPPED | OUTPATIENT
Start: 2020-04-07 | End: 2020-04-16 | Stop reason: HOSPADM

## 2020-04-07 RX ORDER — KETOROLAC TROMETHAMINE 10 MG/1
10 TABLET, FILM COATED ORAL EVERY 6 HOURS PRN
Qty: 20 TABLET | Refills: 0 | Status: SHIPPED | OUTPATIENT
Start: 2020-04-07 | End: 2020-04-16 | Stop reason: HOSPADM

## 2020-04-08 ENCOUNTER — TELEPHONE (OUTPATIENT)
Dept: UROLOGY | Facility: MEDICAL CENTER | Age: 40
End: 2020-04-08

## 2020-04-09 ENCOUNTER — APPOINTMENT (OUTPATIENT)
Dept: LAB | Age: 40
End: 2020-04-09
Payer: COMMERCIAL

## 2020-04-09 DIAGNOSIS — N20.1 LEFT URETERAL STONE: ICD-10-CM

## 2020-04-09 LAB
BACTERIA UR QL AUTO: ABNORMAL /HPF
BILIRUB UR QL STRIP: NEGATIVE
CLARITY UR: CLEAR
COLOR UR: YELLOW
GLUCOSE UR STRIP-MCNC: NEGATIVE MG/DL
HGB UR QL STRIP.AUTO: ABNORMAL
HYALINE CASTS #/AREA URNS LPF: ABNORMAL /LPF
KETONES UR STRIP-MCNC: NEGATIVE MG/DL
LEUKOCYTE ESTERASE UR QL STRIP: NEGATIVE
NITRITE UR QL STRIP: NEGATIVE
NON-SQ EPI CELLS URNS QL MICRO: ABNORMAL /HPF
PH UR STRIP.AUTO: 6 [PH]
PROT UR STRIP-MCNC: NEGATIVE MG/DL
RBC #/AREA URNS AUTO: ABNORMAL /HPF
SP GR UR STRIP.AUTO: 1.02 (ref 1–1.03)
UROBILINOGEN UR QL STRIP.AUTO: 0.2 E.U./DL
WBC #/AREA URNS AUTO: ABNORMAL /HPF

## 2020-04-09 PROCEDURE — 87086 URINE CULTURE/COLONY COUNT: CPT

## 2020-04-09 PROCEDURE — 81001 URINALYSIS AUTO W/SCOPE: CPT | Performed by: UROLOGY

## 2020-04-10 LAB — BACTERIA UR CULT: NORMAL

## 2020-04-15 ENCOUNTER — ANESTHESIA EVENT (OUTPATIENT)
Dept: PERIOP | Facility: HOSPITAL | Age: 40
End: 2020-04-15
Payer: COMMERCIAL

## 2020-04-16 ENCOUNTER — TELEPHONE (OUTPATIENT)
Dept: UROLOGY | Facility: MEDICAL CENTER | Age: 40
End: 2020-04-16

## 2020-04-16 ENCOUNTER — APPOINTMENT (OUTPATIENT)
Dept: RADIOLOGY | Facility: HOSPITAL | Age: 40
End: 2020-04-16
Payer: COMMERCIAL

## 2020-04-16 ENCOUNTER — ANESTHESIA (OUTPATIENT)
Dept: PERIOP | Facility: HOSPITAL | Age: 40
End: 2020-04-16
Payer: COMMERCIAL

## 2020-04-16 ENCOUNTER — HOSPITAL ENCOUNTER (OUTPATIENT)
Facility: HOSPITAL | Age: 40
Setting detail: OUTPATIENT SURGERY
Discharge: HOME | End: 2020-04-16
Attending: UROLOGY | Admitting: UROLOGY
Payer: COMMERCIAL

## 2020-04-16 VITALS
HEIGHT: 64 IN | OXYGEN SATURATION: 99 % | TEMPERATURE: 98.4 F | WEIGHT: 154 LBS | HEART RATE: 79 BPM | DIASTOLIC BLOOD PRESSURE: 63 MMHG | RESPIRATION RATE: 14 BRPM | BODY MASS INDEX: 26.29 KG/M2 | SYSTOLIC BLOOD PRESSURE: 131 MMHG

## 2020-04-16 DIAGNOSIS — N20.1 LEFT URETERAL STONE: ICD-10-CM

## 2020-04-16 DIAGNOSIS — N20.0 NEPHROLITHIASIS: Primary | ICD-10-CM

## 2020-04-16 DIAGNOSIS — N20.0 CALCULUS OF KIDNEY: Primary | ICD-10-CM

## 2020-04-16 LAB
EXT PREGNANCY TEST URINE: NEGATIVE
EXT. CONTROL: NORMAL

## 2020-04-16 PROCEDURE — 74420 UROGRAPHY RTRGR +-KUB: CPT

## 2020-04-16 PROCEDURE — 82360 CALCULUS ASSAY QUANT: CPT | Performed by: UROLOGY

## 2020-04-16 PROCEDURE — 52356 CYSTO/URETERO W/LITHOTRIPSY: CPT | Performed by: UROLOGY

## 2020-04-16 PROCEDURE — C2617 STENT, NON-COR, TEM W/O DEL: HCPCS | Performed by: UROLOGY

## 2020-04-16 PROCEDURE — 81025 URINE PREGNANCY TEST: CPT | Performed by: ANESTHESIOLOGY

## 2020-04-16 PROCEDURE — C1769 GUIDE WIRE: HCPCS | Performed by: UROLOGY

## 2020-04-16 PROCEDURE — NC001 PR NO CHARGE: Performed by: UROLOGY

## 2020-04-16 DEVICE — VARIABLE LENGTH INJECTION STENT SET
Type: IMPLANTABLE DEVICE | Site: URETER | Status: FUNCTIONAL
Brand: CONTOUR VL™ INJECTION STENT SET

## 2020-04-16 RX ORDER — LIDOCAINE HYDROCHLORIDE 20 MG/ML
INJECTION, SOLUTION EPIDURAL; INFILTRATION; INTRACAUDAL; PERINEURAL AS NEEDED
Status: DISCONTINUED | OUTPATIENT
Start: 2020-04-16 | End: 2020-04-16 | Stop reason: SURG

## 2020-04-16 RX ORDER — SODIUM CHLORIDE 9 MG/ML
INJECTION, SOLUTION INTRAVENOUS AS NEEDED
Status: DISCONTINUED | OUTPATIENT
Start: 2020-04-16 | End: 2020-04-16 | Stop reason: HOSPADM

## 2020-04-16 RX ORDER — TAMSULOSIN HYDROCHLORIDE 0.4 MG/1
0.4 CAPSULE ORAL
Qty: 14 CAPSULE | Refills: 0 | Status: SHIPPED | OUTPATIENT
Start: 2020-04-16 | End: 2020-09-25 | Stop reason: ALTCHOICE

## 2020-04-16 RX ORDER — MIDAZOLAM HYDROCHLORIDE 2 MG/2ML
INJECTION, SOLUTION INTRAMUSCULAR; INTRAVENOUS AS NEEDED
Status: DISCONTINUED | OUTPATIENT
Start: 2020-04-16 | End: 2020-04-16 | Stop reason: SURG

## 2020-04-16 RX ORDER — ACETAMINOPHEN 325 MG/1
650 TABLET ORAL EVERY 6 HOURS PRN
Status: DISCONTINUED | OUTPATIENT
Start: 2020-04-16 | End: 2020-04-16 | Stop reason: HOSPADM

## 2020-04-16 RX ORDER — ONDANSETRON 2 MG/ML
4 INJECTION INTRAMUSCULAR; INTRAVENOUS EVERY 6 HOURS PRN
Status: DISCONTINUED | OUTPATIENT
Start: 2020-04-16 | End: 2020-04-16 | Stop reason: HOSPADM

## 2020-04-16 RX ORDER — DEXAMETHASONE SODIUM PHOSPHATE 4 MG/ML
INJECTION, SOLUTION INTRA-ARTICULAR; INTRALESIONAL; INTRAMUSCULAR; INTRAVENOUS; SOFT TISSUE AS NEEDED
Status: DISCONTINUED | OUTPATIENT
Start: 2020-04-16 | End: 2020-04-16 | Stop reason: SURG

## 2020-04-16 RX ORDER — PHENAZOPYRIDINE HYDROCHLORIDE 200 MG/1
200 TABLET, FILM COATED ORAL ONCE AS NEEDED
Status: COMPLETED | OUTPATIENT
Start: 2020-04-16 | End: 2020-04-16

## 2020-04-16 RX ORDER — ONDANSETRON 2 MG/ML
INJECTION INTRAMUSCULAR; INTRAVENOUS AS NEEDED
Status: DISCONTINUED | OUTPATIENT
Start: 2020-04-16 | End: 2020-04-16 | Stop reason: SURG

## 2020-04-16 RX ORDER — SODIUM CHLORIDE 9 MG/ML
125 INJECTION, SOLUTION INTRAVENOUS CONTINUOUS
Status: DISCONTINUED | OUTPATIENT
Start: 2020-04-16 | End: 2020-04-16 | Stop reason: HOSPADM

## 2020-04-16 RX ORDER — FENTANYL CITRATE/PF 50 MCG/ML
25 SYRINGE (ML) INJECTION
Status: COMPLETED | OUTPATIENT
Start: 2020-04-16 | End: 2020-04-16

## 2020-04-16 RX ORDER — OXYCODONE HYDROCHLORIDE 5 MG/1
5 TABLET ORAL EVERY 4 HOURS PRN
Status: DISCONTINUED | OUTPATIENT
Start: 2020-04-16 | End: 2020-04-16 | Stop reason: HOSPADM

## 2020-04-16 RX ORDER — PROPOFOL 10 MG/ML
INJECTION, EMULSION INTRAVENOUS AS NEEDED
Status: DISCONTINUED | OUTPATIENT
Start: 2020-04-16 | End: 2020-04-16 | Stop reason: SURG

## 2020-04-16 RX ORDER — ONDANSETRON 2 MG/ML
4 INJECTION INTRAMUSCULAR; INTRAVENOUS ONCE AS NEEDED
Status: DISCONTINUED | OUTPATIENT
Start: 2020-04-16 | End: 2020-04-16 | Stop reason: HOSPADM

## 2020-04-16 RX ORDER — MAGNESIUM HYDROXIDE 1200 MG/15ML
LIQUID ORAL AS NEEDED
Status: DISCONTINUED | OUTPATIENT
Start: 2020-04-16 | End: 2020-04-16 | Stop reason: HOSPADM

## 2020-04-16 RX ORDER — KETOROLAC TROMETHAMINE 30 MG/ML
15 INJECTION, SOLUTION INTRAMUSCULAR; INTRAVENOUS EVERY 6 HOURS SCHEDULED
Status: DISCONTINUED | OUTPATIENT
Start: 2020-04-16 | End: 2020-04-16 | Stop reason: HOSPADM

## 2020-04-16 RX ORDER — OXYCODONE HYDROCHLORIDE 5 MG/1
TABLET ORAL
Qty: 10 TABLET | Refills: 0 | Status: SHIPPED | OUTPATIENT
Start: 2020-04-16 | End: 2020-09-25 | Stop reason: ALTCHOICE

## 2020-04-16 RX ORDER — FENTANYL CITRATE 50 UG/ML
INJECTION, SOLUTION INTRAMUSCULAR; INTRAVENOUS AS NEEDED
Status: DISCONTINUED | OUTPATIENT
Start: 2020-04-16 | End: 2020-04-16 | Stop reason: SURG

## 2020-04-16 RX ORDER — CEFAZOLIN SODIUM 1 G/50ML
1000 SOLUTION INTRAVENOUS ONCE
Status: COMPLETED | OUTPATIENT
Start: 2020-04-16 | End: 2020-04-16

## 2020-04-16 RX ADMIN — DEXAMETHASONE SODIUM PHOSPHATE 4 MG: 4 INJECTION, SOLUTION INTRAMUSCULAR; INTRAVENOUS at 13:55

## 2020-04-16 RX ADMIN — PHENAZOPYRIDINE 200 MG: 200 TABLET ORAL at 16:36

## 2020-04-16 RX ADMIN — PROPOFOL 200 MG: 10 INJECTION, EMULSION INTRAVENOUS at 13:53

## 2020-04-16 RX ADMIN — FENTANYL CITRATE 25 MCG: 50 INJECTION, SOLUTION INTRAMUSCULAR; INTRAVENOUS at 15:10

## 2020-04-16 RX ADMIN — SODIUM CHLORIDE 125 ML/HR: 0.9 INJECTION, SOLUTION INTRAVENOUS at 11:32

## 2020-04-16 RX ADMIN — OXYCODONE HYDROCHLORIDE 5 MG: 5 TABLET ORAL at 16:36

## 2020-04-16 RX ADMIN — CEFAZOLIN SODIUM 1000 MG: 1 SOLUTION INTRAVENOUS at 13:50

## 2020-04-16 RX ADMIN — FENTANYL CITRATE 50 MCG: 50 INJECTION, SOLUTION INTRAMUSCULAR; INTRAVENOUS at 14:33

## 2020-04-16 RX ADMIN — FENTANYL CITRATE 25 MCG: 50 INJECTION, SOLUTION INTRAMUSCULAR; INTRAVENOUS at 15:18

## 2020-04-16 RX ADMIN — SODIUM CHLORIDE: 0.9 INJECTION, SOLUTION INTRAVENOUS at 14:33

## 2020-04-16 RX ADMIN — SODIUM CHLORIDE: 0.9 INJECTION, SOLUTION INTRAVENOUS at 14:18

## 2020-04-16 RX ADMIN — MIDAZOLAM 2 MG: 1 INJECTION INTRAMUSCULAR; INTRAVENOUS at 13:49

## 2020-04-16 RX ADMIN — ONDANSETRON 4 MG: 2 INJECTION INTRAMUSCULAR; INTRAVENOUS at 13:55

## 2020-04-16 RX ADMIN — PROPOFOL 100 MG: 10 INJECTION, EMULSION INTRAVENOUS at 14:29

## 2020-04-16 RX ADMIN — LIDOCAINE HYDROCHLORIDE 60 MG: 20 INJECTION, SOLUTION EPIDURAL; INFILTRATION; INTRACAUDAL; PERINEURAL at 13:53

## 2020-04-16 RX ADMIN — FENTANYL CITRATE 25 MCG: 50 INJECTION, SOLUTION INTRAMUSCULAR; INTRAVENOUS at 15:32

## 2020-04-16 RX ADMIN — FENTANYL CITRATE 100 MCG: 50 INJECTION, SOLUTION INTRAMUSCULAR; INTRAVENOUS at 13:53

## 2020-04-16 RX ADMIN — FENTANYL CITRATE 25 MCG: 50 INJECTION, SOLUTION INTRAMUSCULAR; INTRAVENOUS at 15:26

## 2020-04-16 RX ADMIN — FENTANYL CITRATE 100 MCG: 50 INJECTION, SOLUTION INTRAMUSCULAR; INTRAVENOUS at 14:10

## 2020-04-18 ENCOUNTER — TELEPHONE (OUTPATIENT)
Dept: OTHER | Facility: OTHER | Age: 40
End: 2020-04-18

## 2020-04-18 DIAGNOSIS — N20.1 LEFT URETERAL STONE: Primary | ICD-10-CM

## 2020-04-18 RX ORDER — KETOROLAC TROMETHAMINE 10 MG/1
10 TABLET, FILM COATED ORAL EVERY 6 HOURS PRN
Qty: 10 TABLET | Refills: 0 | Status: SHIPPED | OUTPATIENT
Start: 2020-04-18 | End: 2020-09-25 | Stop reason: ALTCHOICE

## 2020-04-23 LAB
CALCIUM OXALATE DIHYDRATE MFR STONE IR: 100 %
COLOR STONE: NORMAL
COMMENT-STONE3: NORMAL
COMPOSITION: NORMAL
LABORATORY COMMENT REPORT: NORMAL
PHOTO: NORMAL
SIZE STONE: NORMAL MM
SPEC SOURCE SUBJ: NORMAL
STONE ANALYSIS-IMP: NORMAL
WT STONE: 2 MG

## 2020-04-23 RX ORDER — OXYBUTYNIN CHLORIDE 5 MG/1
5 TABLET ORAL 3 TIMES DAILY
Qty: 30 TABLET | Refills: 0 | Status: SHIPPED | OUTPATIENT
Start: 2020-04-23 | End: 2020-09-25 | Stop reason: ALTCHOICE

## 2020-04-23 RX ORDER — PHENAZOPYRIDINE HYDROCHLORIDE 200 MG/1
200 TABLET, FILM COATED ORAL
Qty: 10 TABLET | Refills: 0 | Status: SHIPPED | OUTPATIENT
Start: 2020-04-23 | End: 2020-09-25 | Stop reason: ALTCHOICE

## 2020-09-25 ENCOUNTER — OFFICE VISIT (OUTPATIENT)
Dept: UROLOGY | Facility: MEDICAL CENTER | Age: 40
End: 2020-09-25
Payer: COMMERCIAL

## 2020-09-25 VITALS
WEIGHT: 157 LBS | BODY MASS INDEX: 26.8 KG/M2 | TEMPERATURE: 97.9 F | HEIGHT: 64 IN | DIASTOLIC BLOOD PRESSURE: 70 MMHG | SYSTOLIC BLOOD PRESSURE: 110 MMHG

## 2020-09-25 DIAGNOSIS — N20.0 CALCULUS OF KIDNEY: Primary | ICD-10-CM

## 2020-09-25 PROBLEM — N20.1 LEFT URETERAL STONE: Status: RESOLVED | Noted: 2020-03-27 | Resolved: 2020-09-25

## 2020-09-25 PROCEDURE — 99213 OFFICE O/P EST LOW 20 MIN: CPT | Performed by: UROLOGY

## 2020-09-25 NOTE — PROGRESS NOTES
Assessment/Plan:    No problem-specific Assessment & Plan notes found for this encounter  Diagnoses and all orders for this visit:    Calculus of kidney  -     Basic metabolic panel; Future  -     Calcium; Future  -     Magnesium; Future  -     Phosphorus; Future  -     PTH, intact; Future  -     Uric acid; Future  -     Stone risk profile          Subjective:      Patient ID: Hannah Barillas is a 35 y o  female  Chief complaint:  Kidney stones    HPI:  28-year-old female who is now approximately 6 months post left ureteroscopy  She has known left kidney stones as well  She reports she is doing well  She has no pain  She is voiding with a good force emptying her bladder adequately  There is no gross hematuria, dysuria or symptoms of infection  Since ureteroscopy she notes that she has done well and she has not had any stone events  She has passed kidney stones in the past   She returns today for follow-up  The following portions of the patient's history were reviewed and updated as appropriate: allergies, current medications, past family history, past medical history, past social history, past surgical history and problem list     Review of Systems   Constitutional: Negative  Negative for activity change, appetite change, fatigue and fever  HENT: Negative  Eyes: Negative  Respiratory: Negative  Cardiovascular: Negative  Gastrointestinal: Negative for blood in stool, constipation, diarrhea and vomiting  Endocrine: Negative  Genitourinary:        See HPI   Musculoskeletal: Negative  Skin: Negative  Allergic/Immunologic: Negative  Neurological: Negative  Hematological: Negative  Psychiatric/Behavioral: Negative  Objective:      /70 (BP Location: Left arm, Patient Position: Sitting, Cuff Size: Adult)   Temp 97 9 °F (36 6 °C)   Ht 5' 4" (1 626 m)   Wt 71 2 kg (157 lb)   BMI 26 95 kg/m²          Physical Exam  Vitals signs reviewed   Exam conducted with a chaperone present  Constitutional:       Appearance: She is well-developed  HENT:      Head: Normocephalic and atraumatic  Eyes:      General: No scleral icterus  Conjunctiva/sclera: Conjunctivae normal    Neck:      Musculoskeletal: Neck supple  Cardiovascular:      Rate and Rhythm: Normal rate  Pulmonary:      Effort: Pulmonary effort is normal    Abdominal:      General: There is no distension  Palpations: Abdomen is soft  There is no mass  Tenderness: There is no abdominal tenderness  There is no right CVA tenderness, left CVA tenderness, guarding or rebound  Hernia: No hernia is present  Skin:     General: Skin is warm and dry  Neurological:      Mental Status: She is alert and oriented to person, place, and time  Psychiatric:         Behavior: Behavior normal          Thought Content:  Thought content normal          Judgment: Judgment normal

## 2020-09-25 NOTE — ASSESSMENT & PLAN NOTE
Options were discussed with the patient and I did recommend we proceed with a metabolic stone evaluation  She will return in 6 months and we will check a KUB prior to her next visit

## 2020-09-25 NOTE — PATIENT INSTRUCTIONS
Kidney Stones   WHAT YOU NEED TO KNOW:   What is a kidney stone? Kidney stones form in the urinary system when the water and waste in your urine are out of balance  When this happens, certain types of waste crystals separate from the urine  The crystals build up and form kidney stones  Kidney stones can be made of uric acid, calcium, phosphate, or oxalate crystals  You may have 1 or more kidney stones  What increases my risk for kidney stones? · You do not drink enough liquids (especially water) each day  · You have urinary tract infections often  · You follow a certain type of diet  For example, people who eat a diet high in meat or salt may be at higher risk for kidney stones  People who eat foods high in oxalate may also be at higher risk  Foods that are high in oxalate include nuts, chocolate, coffee, and green leafy vegetables  · You take certain medicines such as diuretics, steroids, and antacids  · A family member has had kidney stones  · You were born with a kidney or bowel disorder, or you have other medical problems such as gout  What are the signs and symptoms of kidney stones? · Pain in the middle of your back that moves across to your side or that may spread to your groin    · Nausea and vomiting    · Urge to urinate often, burning feeling when you urinate, or pink or red urine    · Tenderness in your lower back, side, or stomach  How are kidney stones diagnosed? Your healthcare provider will ask about your health, diet, and lifestyle  He may refer you to a urologist  Bernard Caba may need tests to find out what type of kidney stones you have  Tests can show the size of your kidney stones and where they are in your urinary system  You may have one or more of the following:  · Urine tests  may show if you have blood in your urine  They may also show high amounts of the substances that form kidney stones, such as uric acid  · Blood tests  show how well your kidneys are working   They may also be used to check the levels of calcium or uric acid in your blood  · A noncontrast helical CT scan  is a type of x-ray that uses a computer to take pictures of your kidneys  Healthcare providers use the pictures to check for kidney stones or other problems  · X-rays  of your kidneys, bladder, and ureters may be done  You may be given a dye before the pictures are taken to help healthcare providers see the pictures better  You may need to have more than one x-ray  Tell the healthcare provider if you have ever had an allergic reaction to contrast dye  · An abdominal ultrasound  uses sound waves to show pictures of your abdomen on a monitor  How are kidney stones treated? · NSAIDs , such as ibuprofen, help decrease swelling, pain, and fever  This medicine is available with or without a doctor's order  NSAIDs can cause stomach bleeding or kidney problems in certain people  If you take blood thinner medicine, always ask your healthcare provider if NSAIDs are safe for you  Always read the medicine label and follow directions  · Prescription medicine  may be given  Ask how to take this medicine safely  · Medicines  to balance your electrolytes may be needed  · A procedure or surgery  to remove the kidney stones may be needed if they do not pass on their own  Your treatment will depend on the size and location of your kidney stones  How can I manage my symptoms? · Drink plenty of liquids  Your healthcare provider may tell you to drink at least 8 to 12 (eight-ounce) cups of liquids each day  This helps flush out the kidney stones when you urinate  Water is the best liquid to drink  · Strain your urine every time you go to the bathroom  Urinate through a strainer or a piece of thin cloth to catch the stones  Take the stones to your healthcare provider so they can be sent to the lab for tests  This will help your healthcare providers plan the best treatment for you             · Eat a variety of healthy foods  Healthy foods include fruits, vegetables, whole-grain breads, low-fat dairy products, beans, and fish  You may need to limit how much sodium (salt) or protein you eat  Ask for information about the best foods for you  · Exercise regularly  Your stones may pass more easily if you stay active  Ask about the best activities for you  When should I seek immediate care? · You have vomiting that is not relieved by medicine  When should I contact my healthcare provider? · You have a fever  · You have trouble passing urine  · You see blood in your urine  · You have severe pain  · You have any questions or concerns about your condition or care  CARE AGREEMENT:   You have the right to help plan your care  Learn about your health condition and how it may be treated  Discuss treatment options with your caregivers to decide what care you want to receive  You always have the right to refuse treatment  The above information is an  only  It is not intended as medical advice for individual conditions or treatments  Talk to your doctor, nurse or pharmacist before following any medical regimen to see if it is safe and effective for you  © 2017 [employer] Information is for End User's use only and may not be sold, redistributed or otherwise used for commercial purposes  All illustrations and images included in CareNotes® are the copyrighted property of [employer]  or [employer]

## 2020-12-30 ENCOUNTER — HOSPITAL ENCOUNTER (OUTPATIENT)
Dept: RADIOLOGY | Facility: HOSPITAL | Age: 40
Discharge: HOME | End: 2020-12-30
Attending: UROLOGY
Payer: COMMERCIAL

## 2020-12-30 DIAGNOSIS — N20.0 CALCULUS OF KIDNEY: ICD-10-CM

## 2020-12-30 PROCEDURE — 74018 RADEX ABDOMEN 1 VIEW: CPT

## 2020-12-31 ENCOUNTER — OFFICE VISIT (OUTPATIENT)
Dept: FAMILY MEDICINE CLINIC | Facility: CLINIC | Age: 40
End: 2020-12-31
Payer: COMMERCIAL

## 2020-12-31 ENCOUNTER — APPOINTMENT (OUTPATIENT)
Dept: LAB | Age: 40
End: 2020-12-31
Payer: COMMERCIAL

## 2020-12-31 ENCOUNTER — TRANSCRIBE ORDERS (OUTPATIENT)
Dept: ADMINISTRATIVE | Facility: HOSPITAL | Age: 40
End: 2020-12-31

## 2020-12-31 VITALS
BODY MASS INDEX: 25.75 KG/M2 | WEIGHT: 150 LBS | HEART RATE: 71 BPM | SYSTOLIC BLOOD PRESSURE: 110 MMHG | DIASTOLIC BLOOD PRESSURE: 74 MMHG

## 2020-12-31 DIAGNOSIS — F43.10 PTSD (POST-TRAUMATIC STRESS DISORDER): ICD-10-CM

## 2020-12-31 DIAGNOSIS — F41.9 ANXIETY: ICD-10-CM

## 2020-12-31 DIAGNOSIS — Z12.31 SCREENING MAMMOGRAM, ENCOUNTER FOR: ICD-10-CM

## 2020-12-31 DIAGNOSIS — Z00.00 ROUTINE GENERAL MEDICAL EXAMINATION AT A HEALTH CARE FACILITY: Primary | ICD-10-CM

## 2020-12-31 DIAGNOSIS — Z00.00 ANNUAL PHYSICAL EXAM: ICD-10-CM

## 2020-12-31 DIAGNOSIS — Z23 ENCOUNTER FOR IMMUNIZATION: ICD-10-CM

## 2020-12-31 DIAGNOSIS — Z00.00 WELL ADULT EXAM: Primary | ICD-10-CM

## 2020-12-31 DIAGNOSIS — N20.0 CALCULUS OF KIDNEY: ICD-10-CM

## 2020-12-31 DIAGNOSIS — Z00.00 ROUTINE GENERAL MEDICAL EXAMINATION AT A HEALTH CARE FACILITY: ICD-10-CM

## 2020-12-31 LAB
ALBUMIN SERPL BCP-MCNC: 4.4 G/DL (ref 3.5–5)
ALP SERPL-CCNC: 101 U/L (ref 46–116)
ALT SERPL W P-5'-P-CCNC: 64 U/L (ref 12–78)
ANION GAP SERPL CALCULATED.3IONS-SCNC: 8 MMOL/L (ref 4–13)
AST SERPL W P-5'-P-CCNC: 24 U/L (ref 5–45)
BILIRUB SERPL-MCNC: 0.55 MG/DL (ref 0.2–1)
BUN SERPL-MCNC: 19 MG/DL (ref 5–25)
CALCIUM SERPL-MCNC: 9.3 MG/DL (ref 8.3–10.1)
CHLORIDE SERPL-SCNC: 111 MMOL/L (ref 100–108)
CHOLEST SERPL-MCNC: 227 MG/DL (ref 50–200)
CO2 SERPL-SCNC: 22 MMOL/L (ref 21–32)
CREAT SERPL-MCNC: 0.93 MG/DL (ref 0.6–1.3)
GFR SERPL CREATININE-BSD FRML MDRD: 77 ML/MIN/1.73SQ M
GLUCOSE P FAST SERPL-MCNC: 105 MG/DL (ref 65–99)
HDLC SERPL-MCNC: 38 MG/DL
LDLC SERPL CALC-MCNC: 164 MG/DL (ref 0–100)
MAGNESIUM SERPL-MCNC: 2.2 MG/DL (ref 1.6–2.6)
NONHDLC SERPL-MCNC: 189 MG/DL
PHOSPHATE SERPL-MCNC: 3 MG/DL (ref 2.7–4.5)
POTASSIUM SERPL-SCNC: 3.8 MMOL/L (ref 3.5–5.3)
PROT SERPL-MCNC: 7.8 G/DL (ref 6.4–8.2)
PTH-INTACT SERPL-MCNC: 32 PG/ML (ref 18.4–80.1)
SODIUM SERPL-SCNC: 141 MMOL/L (ref 136–145)
TRIGL SERPL-MCNC: 123 MG/DL
URATE SERPL-MCNC: 6 MG/DL (ref 2–6.8)

## 2020-12-31 PROCEDURE — 82507 ASSAY OF CITRATE: CPT | Performed by: UROLOGY

## 2020-12-31 PROCEDURE — 83945 ASSAY OF OXALATE: CPT | Performed by: UROLOGY

## 2020-12-31 PROCEDURE — 84560 ASSAY OF URINE/URIC ACID: CPT | Performed by: UROLOGY

## 2020-12-31 PROCEDURE — 83970 ASSAY OF PARATHORMONE: CPT

## 2020-12-31 PROCEDURE — 84133 ASSAY OF URINE POTASSIUM: CPT | Performed by: UROLOGY

## 2020-12-31 PROCEDURE — 83735 ASSAY OF MAGNESIUM: CPT | Performed by: UROLOGY

## 2020-12-31 PROCEDURE — 81003 URINALYSIS AUTO W/O SCOPE: CPT | Performed by: UROLOGY

## 2020-12-31 PROCEDURE — 82140 ASSAY OF AMMONIA: CPT | Performed by: UROLOGY

## 2020-12-31 PROCEDURE — 80061 LIPID PANEL: CPT

## 2020-12-31 PROCEDURE — 82436 ASSAY OF URINE CHLORIDE: CPT | Performed by: UROLOGY

## 2020-12-31 PROCEDURE — 80053 COMPREHEN METABOLIC PANEL: CPT

## 2020-12-31 PROCEDURE — 83735 ASSAY OF MAGNESIUM: CPT

## 2020-12-31 PROCEDURE — 99386 PREV VISIT NEW AGE 40-64: CPT | Performed by: FAMILY MEDICINE

## 2020-12-31 PROCEDURE — 3725F SCREEN DEPRESSION PERFORMED: CPT | Performed by: FAMILY MEDICINE

## 2020-12-31 PROCEDURE — 84300 ASSAY OF URINE SODIUM: CPT | Performed by: UROLOGY

## 2020-12-31 PROCEDURE — 82131 AMINO ACIDS SINGLE QUANT: CPT | Performed by: UROLOGY

## 2020-12-31 PROCEDURE — 82340 ASSAY OF CALCIUM IN URINE: CPT | Performed by: UROLOGY

## 2020-12-31 PROCEDURE — 82570 ASSAY OF URINE CREATININE: CPT | Performed by: UROLOGY

## 2020-12-31 PROCEDURE — 36415 COLL VENOUS BLD VENIPUNCTURE: CPT

## 2020-12-31 PROCEDURE — 84392 ASSAY OF URINE SULFATE: CPT | Performed by: UROLOGY

## 2020-12-31 PROCEDURE — 4004F PT TOBACCO SCREEN RCVD TLK: CPT | Performed by: FAMILY MEDICINE

## 2020-12-31 PROCEDURE — 84550 ASSAY OF BLOOD/URIC ACID: CPT

## 2020-12-31 PROCEDURE — 83935 ASSAY OF URINE OSMOLALITY: CPT | Performed by: UROLOGY

## 2020-12-31 PROCEDURE — 84100 ASSAY OF PHOSPHORUS: CPT

## 2020-12-31 PROCEDURE — 99204 OFFICE O/P NEW MOD 45 MIN: CPT | Performed by: FAMILY MEDICINE

## 2020-12-31 PROCEDURE — 84105 ASSAY OF URINE PHOSPHORUS: CPT | Performed by: UROLOGY

## 2020-12-31 RX ORDER — BUSPIRONE HYDROCHLORIDE 5 MG/1
5 TABLET ORAL 2 TIMES DAILY
Qty: 60 TABLET | Refills: 5 | Status: SHIPPED | OUTPATIENT
Start: 2020-12-31 | End: 2021-03-09 | Stop reason: SINTOL

## 2021-01-12 LAB
AMMONIA 24H UR-MRATE: 23 MEQ/24 HR
AMMONIA UR-SCNC: ABNORMAL UG/DL
CA H2 PHOS DIHYD CRY URNS QL MICRO: 2.69 RATIO (ref 0–3)
CALCIUM 24H UR-MCNC: 11.6 MG/DL
CALCIUM 24H UR-MRATE: 197.2 MG/24 HR (ref 100–300)
CHLORIDE 24H UR-SCNC: 105 MMOL/L
CHLORIDE 24H UR-SRATE: 179 MMOL/24 HR (ref 110–250)
CITRATE 24H UR-MCNC: 75 MG/L
CITRATE 24H UR-MRATE: 128 MG/24 HR (ref 320–1240)
COM CRY STONE QL IR: 6.31 RATIO (ref 0–6)
CREAT 24H UR-MCNC: 79.6 MG/DL
CREAT 24H UR-MRATE: 1353.2 MG/24 HR (ref 800–1800)
CYSTINE 24H UR-MCNC: 7.38 MG/L
CYSTINE 24H UR-MRATE: 12.55 MG/24 HR (ref 10–100)
MAGNESIUM 24H UR-MRATE: 80 MG/24 HR (ref 12–293)
MAGNESIUM UR-MCNC: 4.7 MG/DL
NA URATE CRY STONE QL IR: 5.07 RATIO (ref 0–4)
OSMOLALITY UR: 526 MOSMOL/KG (ref 300–900)
OXALATE 24H UR-MRATE: 32 MG/24 HR (ref 4–31)
OXALATE UR-MCNC: 19 MG/L
PH 24H UR: 6.3 [PH]
PHOSPHATE 24H UR-MCNC: 58.2 MG/DL
PHOSPHATE 24H UR-MRATE: 989.4 MG/24 HR (ref 400–1300)
PLEASE NOTE (STONE RISK): ABNORMAL
POTASSIUM 24H UR-SCNC: 47.1 MMOL/24 HR (ref 25–125)
POTASSIUM UR-SCNC: 27.7 MMOL/L
PRESERVED URINE: 1700 ML/24 HR (ref 600–1600)
SODIUM 24H UR-SCNC: 126 MMOL/L
SODIUM 24H UR-SRATE: 214 MMOL/24 HR (ref 39–258)
SPECIMEN VOL 24H UR: 1700 ML/24 HR (ref 600–1600)
SULFATE 24H UR-MCNC: 32 MEQ/24 HR (ref 0–30)
SULFATE UR-MCNC: 19 MEQ/L
TRI-PHOS CRY STONE MICRO: 0.04 RATIO (ref 0–1)
URATE 24H UR-MCNC: 36.1 MG/DL
URATE 24H UR-MRATE: 614 MG/24 HR (ref 250–750)
URATE DIHYD CRY STONE QL IR: 0.81 RATIO (ref 0–1.2)

## 2021-03-09 ENCOUNTER — OFFICE VISIT (OUTPATIENT)
Dept: FAMILY MEDICINE CLINIC | Facility: CLINIC | Age: 41
End: 2021-03-09
Payer: COMMERCIAL

## 2021-03-09 VITALS
SYSTOLIC BLOOD PRESSURE: 118 MMHG | TEMPERATURE: 99.5 F | DIASTOLIC BLOOD PRESSURE: 76 MMHG | HEIGHT: 64 IN | BODY MASS INDEX: 27.66 KG/M2 | WEIGHT: 162 LBS

## 2021-03-09 DIAGNOSIS — L65.9 HAIR LOSS: ICD-10-CM

## 2021-03-09 DIAGNOSIS — L30.9 DERMATITIS: ICD-10-CM

## 2021-03-09 DIAGNOSIS — F43.10 PTSD (POST-TRAUMATIC STRESS DISORDER): ICD-10-CM

## 2021-03-09 DIAGNOSIS — F41.9 ANXIETY: Primary | ICD-10-CM

## 2021-03-09 PROCEDURE — 99214 OFFICE O/P EST MOD 30 MIN: CPT | Performed by: FAMILY MEDICINE

## 2021-03-09 PROCEDURE — 4004F PT TOBACCO SCREEN RCVD TLK: CPT | Performed by: FAMILY MEDICINE

## 2021-03-09 PROCEDURE — 3008F BODY MASS INDEX DOCD: CPT | Performed by: FAMILY MEDICINE

## 2021-03-09 RX ORDER — HYDROXYZINE HYDROCHLORIDE 25 MG/1
25 TABLET, FILM COATED ORAL EVERY 6 HOURS PRN
Qty: 30 TABLET | Refills: 3 | Status: SHIPPED | OUTPATIENT
Start: 2021-03-09 | End: 2021-06-08 | Stop reason: SDUPTHER

## 2021-03-09 NOTE — PROGRESS NOTES
Assessment/Plan:   She is complaining of itching skin in the back for neck also hair loss  Will stop the BuSpar to see if that helps  Also set up for dermatology referral   Started hydroxyzine 25 mg up to 4 times a day as needed for itching or anxiety  Diagnoses and all orders for this visit:    Anxiety  -     hydrOXYzine HCL (ATARAX) 25 mg tablet; Take 1 tablet (25 mg total) by mouth every 6 (six) hours as needed for itching    PTSD (post-traumatic stress disorder)  -     hydrOXYzine HCL (ATARAX) 25 mg tablet; Take 1 tablet (25 mg total) by mouth every 6 (six) hours as needed for itching    Dermatitis  -     Ambulatory referral to Dermatology; Future    Hair loss  -     Ambulatory referral to Dermatology; Future          Subjective:     Patient ID: Rosi Lane is a 38 y o  female  Patient presents with: Follow-up: 2 month follow up/reviewing lab results/ new medication    She did have some labs done December 31, 2020  She also follows up with urology for retained kidney stones which have been stable  Review of Systems   Constitutional: Negative  HENT: Negative  Eyes: Negative  Respiratory: Negative  Cardiovascular: Negative  Gastrointestinal: Negative  Endocrine: Negative  Genitourinary: Negative  Musculoskeletal: Negative  Skin: Negative  Allergic/Immunologic: Negative  Neurological: Negative  Hematological: Negative  Psychiatric/Behavioral: Negative  All other systems reviewed and are negative  Objective:     Physical Exam  Vitals signs and nursing note reviewed  Constitutional:       Appearance: She is well-developed  HENT:      Head: Normocephalic and atraumatic  Right Ear: External ear normal       Left Ear: External ear normal       Nose: Nose normal    Eyes:      Conjunctiva/sclera: Conjunctivae normal       Pupils: Pupils are equal, round, and reactive to light  Neck:      Musculoskeletal: Normal range of motion and neck supple  Cardiovascular:      Rate and Rhythm: Normal rate and regular rhythm  Heart sounds: Normal heart sounds  Pulmonary:      Effort: Pulmonary effort is normal       Breath sounds: Normal breath sounds  Abdominal:      General: Bowel sounds are normal       Palpations: Abdomen is soft  Musculoskeletal: Normal range of motion  Skin:     General: Skin is warm and dry  Neurological:      Mental Status: She is alert and oriented to person, place, and time  Deep Tendon Reflexes: Reflexes are normal and symmetric     Psychiatric:         Behavior: Behavior normal

## 2021-03-10 DIAGNOSIS — Z23 ENCOUNTER FOR IMMUNIZATION: ICD-10-CM

## 2021-05-03 ENCOUNTER — OFFICE VISIT (OUTPATIENT)
Dept: FAMILY MEDICINE CLINIC | Facility: CLINIC | Age: 41
End: 2021-05-03
Payer: COMMERCIAL

## 2021-05-03 ENCOUNTER — HOSPITAL ENCOUNTER (OUTPATIENT)
Dept: NON INVASIVE DIAGNOSTICS | Facility: HOSPITAL | Age: 41
Discharge: HOME | End: 2021-05-03
Payer: COMMERCIAL

## 2021-05-03 VITALS
SYSTOLIC BLOOD PRESSURE: 110 MMHG | HEIGHT: 64 IN | TEMPERATURE: 99.3 F | BODY MASS INDEX: 27.49 KG/M2 | WEIGHT: 161 LBS | DIASTOLIC BLOOD PRESSURE: 60 MMHG

## 2021-05-03 DIAGNOSIS — I83.811 VARICOSE VEINS OF RIGHT LOWER EXTREMITY WITH PAIN: ICD-10-CM

## 2021-05-03 DIAGNOSIS — I83.811 VARICOSE VEINS OF RIGHT LOWER EXTREMITY WITH PAIN: Primary | ICD-10-CM

## 2021-05-03 PROCEDURE — 99213 OFFICE O/P EST LOW 20 MIN: CPT | Performed by: FAMILY MEDICINE

## 2021-05-03 PROCEDURE — 4004F PT TOBACCO SCREEN RCVD TLK: CPT | Performed by: FAMILY MEDICINE

## 2021-05-03 PROCEDURE — 3008F BODY MASS INDEX DOCD: CPT | Performed by: FAMILY MEDICINE

## 2021-05-03 PROCEDURE — 93971 EXTREMITY STUDY: CPT | Performed by: SURGERY

## 2021-05-03 PROCEDURE — 93971 EXTREMITY STUDY: CPT

## 2021-05-03 NOTE — PROGRESS NOTES
Assessment/Plan: patient go for stat Doppler study of the right lower extremity  Patient may continue with Tylenol  To consider adding Motrin if Doppler study negative  Diagnoses and all orders for this visit:    Varicose veins of right lower extremity with pain  -     VAS lower limb venous duplex study, unilateral/limited; Future            Subjective:        Patient ID: Teresa Ordaz is a 38 y o  female  Patient is here with posterior thigh pain and calf pain over the past 2 weeks  This is worsened over the past 3 days  No redness noted  Patient has had DVT in left leg roughly 2 years ago  No back pain associated with this  No significant chest pain shortness of breath  Patient status post COVID vaccine with med and a period  No previous blood clots before that  Questionable family history of DVTs patient has use Tylenol intermittently  Some relief noted  The following portions of the patient's history were reviewed and updated as appropriate: allergies, current medications, past family history, past medical history, past social history, past surgical history and problem list       Review of Systems   Constitutional: Negative  HENT: Negative  Eyes: Negative  Respiratory: Negative  Cardiovascular: Negative  Gastrointestinal: Negative  Endocrine: Negative  Genitourinary: Negative  Musculoskeletal: Positive for arthralgias  Skin: Negative  Allergic/Immunologic: Negative  Neurological: Negative  Hematological: Negative  Psychiatric/Behavioral: Negative  Objective:      BMI Counseling: Body mass index is 27 64 kg/m²  The BMI is above normal  Nutrition recommendations include decreasing portion sizes  Exercise recommendations include exercising 3-5 times per week                 /60 (Patient Position: Sitting, Cuff Size: Adult)   Temp 99 3 °F (37 4 °C) (Tympanic)   Ht 5' 4" (1 626 m)   Wt 73 kg (161 lb)   BMI 27 64 kg/m² Physical Exam  Vitals signs and nursing note reviewed  Constitutional:       General: She is not in acute distress  Appearance: Normal appearance  She is not ill-appearing, toxic-appearing or diaphoretic  Neck:      Musculoskeletal: Normal range of motion and neck supple  Cardiovascular:      Rate and Rhythm: Normal rate and regular rhythm  Pulses: Normal pulses  Heart sounds: Normal heart sounds  Pulmonary:      Effort: Pulmonary effort is normal       Breath sounds: Normal breath sounds  Musculoskeletal:         General: Tenderness present  Comments: Minimal discomfort with palpation in the right posterior thigh  No calf pain with palpation  No redness noted  Neurological:      Mental Status: She is alert

## 2021-06-08 ENCOUNTER — OFFICE VISIT (OUTPATIENT)
Dept: FAMILY MEDICINE CLINIC | Facility: CLINIC | Age: 41
End: 2021-06-08
Payer: COMMERCIAL

## 2021-06-08 VITALS
BODY MASS INDEX: 27.14 KG/M2 | SYSTOLIC BLOOD PRESSURE: 110 MMHG | HEIGHT: 64 IN | DIASTOLIC BLOOD PRESSURE: 62 MMHG | TEMPERATURE: 98.1 F | WEIGHT: 159 LBS

## 2021-06-08 DIAGNOSIS — F41.9 ANXIETY: ICD-10-CM

## 2021-06-08 DIAGNOSIS — F43.10 PTSD (POST-TRAUMATIC STRESS DISORDER): ICD-10-CM

## 2021-06-08 PROCEDURE — 4004F PT TOBACCO SCREEN RCVD TLK: CPT | Performed by: FAMILY MEDICINE

## 2021-06-08 PROCEDURE — 3008F BODY MASS INDEX DOCD: CPT | Performed by: FAMILY MEDICINE

## 2021-06-08 PROCEDURE — 99214 OFFICE O/P EST MOD 30 MIN: CPT | Performed by: FAMILY MEDICINE

## 2021-06-08 RX ORDER — HYDROXYZINE HYDROCHLORIDE 25 MG/1
25 TABLET, FILM COATED ORAL EVERY 6 HOURS PRN
Qty: 60 TABLET | Refills: 3 | Status: SHIPPED | OUTPATIENT
Start: 2021-06-08

## 2021-06-08 NOTE — PROGRESS NOTES
Assessment/Plan:    She is doing well  Will continue to hydroxyzine as needed for now  I recommend following up with counseling and she will look into that  See her back here in 4-6 months or as needed  Diagnoses and all orders for this visit:    Anxiety  -     hydrOXYzine HCL (ATARAX) 25 mg tablet; Take 1 tablet (25 mg total) by mouth every 6 (six) hours as needed for itching    PTSD (post-traumatic stress disorder)  -     hydrOXYzine HCL (ATARAX) 25 mg tablet; Take 1 tablet (25 mg total) by mouth every 6 (six) hours as needed for itching            Subjective:        Patient ID: Hannah Barillas is a 38 y o  female  She is doing better with the hydroxyzine  Hair loss seems have  [place] discontinuing BuSpar  She still has episodes of panic attacks in situations  The hydroxyzine does seem to help  The following portions of the patient's history were reviewed and updated as appropriate: allergies, current medications, past family history, past medical history, past social history, past surgical history and problem list       Review of Systems   Constitutional: Negative  HENT: Negative  Eyes: Negative  Respiratory: Negative  Cardiovascular: Negative  Gastrointestinal: Negative  Endocrine: Negative  Genitourinary: Negative  Musculoskeletal: Negative  Skin: Negative  Allergic/Immunologic: Negative  Neurological: Negative  Hematological: Negative  Psychiatric/Behavioral: The patient is nervous/anxious  All other systems reviewed and are negative  Objective:             /62 (BP Location: Left arm, Patient Position: Sitting, Cuff Size: Standard)   Temp 98 1 °F (36 7 °C) (Tympanic)   Ht 5' 4" (1 626 m)   Wt 72 1 kg (159 lb)   BMI 27 29 kg/m²          Physical Exam  Vitals signs and nursing note reviewed  Constitutional:       Appearance: She is well-developed  HENT:      Head: Normocephalic and atraumatic        Right Ear: External ear normal       Left Ear: External ear normal       Nose: Nose normal    Eyes:      Conjunctiva/sclera: Conjunctivae normal       Pupils: Pupils are equal, round, and reactive to light  Neck:      Musculoskeletal: Normal range of motion and neck supple  Cardiovascular:      Rate and Rhythm: Normal rate and regular rhythm  Heart sounds: Normal heart sounds  Pulmonary:      Effort: Pulmonary effort is normal       Breath sounds: Normal breath sounds  Abdominal:      General: Bowel sounds are normal       Palpations: Abdomen is soft  Musculoskeletal: Normal range of motion  Skin:     General: Skin is warm and dry  Neurological:      Mental Status: She is alert and oriented to person, place, and time  Deep Tendon Reflexes: Reflexes are normal and symmetric     Psychiatric:         Behavior: Behavior normal

## 2021-06-22 ENCOUNTER — TELEPHONE (OUTPATIENT)
Dept: ADMINISTRATIVE | Facility: OTHER | Age: 41
End: 2021-06-22

## 2021-06-22 NOTE — TELEPHONE ENCOUNTER
----- Message from Tegan De La Cruz sent at 6/22/2021  6:15 AM EDT -----  Regarding: CARE GAP REQUEST  Contact: [phone]  06/22/21 6:15 AM    Hello, our patient Juvenal Jim has had Mammogram completed/performed  Please assist in updating the patient chart by pulling the Care Everywhere (CE) document  The date of service is 6/21/21       Thank you,  Tegan De La Cruz  [facility]

## 2021-06-25 NOTE — TELEPHONE ENCOUNTER
Upon review of the In Basket request we have found/obtained the documentation  After careful review of the document we are unable to complete this request for Mammogram because the documentation does not have the result(s) needed to close the requested care gap(s)  Any additional questions or concerns should be emailed to the Practice Liaisons via [email or web address] email, please do not reply via In Basket      Thank you  Faviola Graf MA

## 2021-08-09 ENCOUNTER — HOSPITAL ENCOUNTER (OUTPATIENT)
Dept: CT IMAGING | Facility: HOSPITAL | Age: 41
Discharge: HOME | End: 2021-08-09
Payer: COMMERCIAL

## 2021-08-09 ENCOUNTER — TELEPHONE (OUTPATIENT)
Dept: UROLOGY | Facility: MEDICAL CENTER | Age: 41
End: 2021-08-09

## 2021-08-09 ENCOUNTER — TELEPHONE (OUTPATIENT)
Dept: UROLOGY | Facility: AMBULATORY SURGERY CENTER | Age: 41
End: 2021-08-09

## 2021-08-09 DIAGNOSIS — N20.0 CALCULUS OF KIDNEY: Primary | ICD-10-CM

## 2021-08-09 DIAGNOSIS — R10.9 LEFT FLANK PAIN: ICD-10-CM

## 2021-08-09 PROCEDURE — G1004 CDSM NDSC: HCPCS

## 2021-08-09 PROCEDURE — 74176 CT ABD & PELVIS W/O CONTRAST: CPT

## 2021-08-09 NOTE — TELEPHONE ENCOUNTER
Left flank pain without episodes of gross hematuria  Denies UTI-like symptoms  Urine testing ordered and CT stone study ordered stat  Please call patient to schedule CT stat  If she develops UTI-like symptoms, her pain becomes intolerable, she has intractable nausea/vomiting, or develops fevers/chills, she is to report directly to the ED  Thanks!

## 2021-08-09 NOTE — TELEPHONE ENCOUNTER
Call placed to patient to further triage her symptoms  Patient is currently having left sided flank pain that is shooting down into her lower pelvic area  She states the pain is about a 6 out of 10 at this point  She also states her stream is very slow  Patient is very uncomfortable and would like to know what she can do about this  I have placed urine testing in for patient to have done  I will send message to AP on site to have her place imaging orders if needed  Patient denies any gross hematuria or fever at this time

## 2021-08-09 NOTE — TELEPHONE ENCOUNTER
Pt is scheduled for STAT CT scan at [facility] at 4:30 PM today, Mon, 8/9/21    Pt aware clear liquids only now until time of test

## 2021-08-10 ENCOUNTER — APPOINTMENT (OUTPATIENT)
Dept: LAB | Age: 41
End: 2021-08-10
Payer: COMMERCIAL

## 2021-08-10 ENCOUNTER — TELEPHONE (OUTPATIENT)
Dept: UROLOGY | Facility: CLINIC | Age: 41
End: 2021-08-10

## 2021-08-10 LAB
BACTERIA UR QL AUTO: ABNORMAL /HPF
BILIRUB UR QL STRIP: NEGATIVE
CAOX CRY URNS QL MICRO: ABNORMAL /HPF
CLARITY UR: CLEAR
COLOR UR: YELLOW
GLUCOSE UR STRIP-MCNC: NEGATIVE MG/DL
HGB UR QL STRIP.AUTO: NEGATIVE
KETONES UR STRIP-MCNC: NEGATIVE MG/DL
LEUKOCYTE ESTERASE UR QL STRIP: NEGATIVE
NITRITE UR QL STRIP: NEGATIVE
NON-SQ EPI CELLS URNS QL MICRO: ABNORMAL /HPF
PH UR STRIP.AUTO: 6 [PH]
PROT UR STRIP-MCNC: NEGATIVE MG/DL
RBC #/AREA URNS AUTO: ABNORMAL /HPF
SP GR UR STRIP.AUTO: 1.02 (ref 1–1.03)
UROBILINOGEN UR QL STRIP.AUTO: 0.2 E.U./DL
WBC #/AREA URNS AUTO: ABNORMAL /HPF

## 2021-08-10 PROCEDURE — 87086 URINE CULTURE/COLONY COUNT: CPT

## 2021-08-10 PROCEDURE — 81001 URINALYSIS AUTO W/SCOPE: CPT

## 2021-08-10 NOTE — TELEPHONE ENCOUNTER
Please help in scheduling    --- Message from Kianna Marquez sent at 8/9/2021  5:11 PM EDT -----  Could we have patient come in 1 week (no sooner) for left ureteral stone follow up? Medical expulsive therapy ordered today  Will trial stone passage for 1 week prior to arranging surgical intervention  (Also, pt is going to have urine testing today  If infected, she will need cysto stent procedure asap   Will await those results )

## 2021-08-11 LAB — BACTERIA UR CULT: NORMAL

## 2021-08-11 NOTE — TELEPHONE ENCOUNTER
Spoke to pt and offered FU appt on 8/23/21 with Angelica Reid PA-C  Pt stated she was not available that week due to being on vacation  Advised pt she would be called with results of UC when received and she would be scheduled for a FU in the next few weeks after attempt at medical expulsion therapy for kidney stone

## 2021-08-12 ENCOUNTER — TELEPHONE (OUTPATIENT)
Dept: UROLOGY | Facility: MEDICAL CENTER | Age: 41
End: 2021-08-12

## 2021-08-12 NOTE — TELEPHONE ENCOUNTER
----- Message from Daryl Miller PA-C sent at 8/11/2021  2:26 PM EDT -----  Urine culture negative  Sent message on Friday asking for patient to be seen in about 7 days  Don't see anything scheduled  Could we look into this? Thanks guys!

## 2021-08-12 NOTE — TELEPHONE ENCOUNTER
Will likely pass her 3 mm stone on her own  9/10 is okay  I reviewed ED parameters with her so she knows when to report to ED if needed  Thanks!

## 2021-08-12 NOTE — TELEPHONE ENCOUNTER
----- Message from Krystyna Burns PA-C sent at 8/11/2021  2:26 PM EDT -----  Urine culture negative  Sent message on Friday asking for patient to be seen in about 7 days  Don't see anything scheduled  Could we look into this? Thanks guys!

## 2021-08-13 NOTE — TELEPHONE ENCOUNTER
Returned call to patient reviewed providers recommendations  Ed precautions reviewed with patient verbalized understanding

## 2021-08-30 DIAGNOSIS — N20.1 LEFT URETERAL STONE: ICD-10-CM

## 2021-08-30 RX ORDER — OXYCODONE HYDROCHLORIDE 5 MG/1
5 TABLET ORAL EVERY 6 HOURS PRN
Qty: 16 TABLET | Refills: 0 | OUTPATIENT
Start: 2021-08-30

## 2021-08-30 RX ORDER — OXYCODONE HYDROCHLORIDE 5 MG/1
5 TABLET ORAL EVERY 6 HOURS PRN
Qty: 16 TABLET | Refills: 0 | Status: SHIPPED | OUTPATIENT
Start: 2021-08-30 | End: 2021-09-09 | Stop reason: SDUPTHER

## 2021-08-30 RX ORDER — TAMSULOSIN HYDROCHLORIDE 0.4 MG/1
0.4 CAPSULE ORAL
Qty: 15 CAPSULE | Refills: 0 | Status: SHIPPED | OUTPATIENT
Start: 2021-08-30 | End: 2021-09-28 | Stop reason: ALTCHOICE

## 2021-08-30 NOTE — TELEPHONE ENCOUNTER
LVM to reschedule appointment to 9/28 at  [facility] as no openings in [place] until Dec;   asked to call and confirm which appointment she wants

## 2021-08-30 NOTE — TELEPHONE ENCOUNTER
Patient is completely out of the medication, stated that she currently is having kidney stone symptoms and "need urgently the medication "

## 2021-08-30 NOTE — TELEPHONE ENCOUNTER
Pt called to reschedule her appointment to an earlier time of the day, however I was unable to accommodate the patient  She is requesting a call back to reschedule the appointment

## 2021-09-09 DIAGNOSIS — N20.1 LEFT URETERAL STONE: ICD-10-CM

## 2021-09-09 RX ORDER — OXYCODONE HYDROCHLORIDE 5 MG/1
5 TABLET ORAL EVERY 6 HOURS PRN
Qty: 16 TABLET | Refills: 0 | Status: SHIPPED | OUTPATIENT
Start: 2021-09-09 | End: 2021-09-14 | Stop reason: SDUPTHER

## 2021-09-10 ENCOUNTER — OFFICE VISIT (OUTPATIENT)
Dept: UROLOGY | Facility: MEDICAL CENTER | Age: 41
End: 2021-09-10
Payer: COMMERCIAL

## 2021-09-10 VITALS
HEIGHT: 64 IN | WEIGHT: 151 LBS | BODY MASS INDEX: 25.78 KG/M2 | SYSTOLIC BLOOD PRESSURE: 114 MMHG | DIASTOLIC BLOOD PRESSURE: 70 MMHG

## 2021-09-10 DIAGNOSIS — N32.89 BLADDER SPASMS: ICD-10-CM

## 2021-09-10 DIAGNOSIS — N20.1 CALCULUS OF URETER: Primary | ICD-10-CM

## 2021-09-10 LAB
SL AMB  POCT GLUCOSE, UA: ABNORMAL
SL AMB LEUKOCYTE ESTERASE,UA: ABNORMAL
SL AMB POCT BILIRUBIN,UA: ABNORMAL
SL AMB POCT BLOOD,UA: ABNORMAL
SL AMB POCT CLARITY,UA: CLEAR
SL AMB POCT COLOR,UA: YELLOW
SL AMB POCT KETONES,UA: ABNORMAL
SL AMB POCT NITRITE,UA: ABNORMAL
SL AMB POCT PH,UA: 5.5
SL AMB POCT SPECIFIC GRAVITY,UA: 1.03
SL AMB POCT URINE PROTEIN: ABNORMAL
SL AMB POCT UROBILINOGEN: 0.2

## 2021-09-10 PROCEDURE — 81003 URINALYSIS AUTO W/O SCOPE: CPT | Performed by: UROLOGY

## 2021-09-10 PROCEDURE — 3008F BODY MASS INDEX DOCD: CPT | Performed by: FAMILY MEDICINE

## 2021-09-10 PROCEDURE — 99214 OFFICE O/P EST MOD 30 MIN: CPT | Performed by: UROLOGY

## 2021-09-10 PROCEDURE — 87086 URINE CULTURE/COLONY COUNT: CPT | Performed by: UROLOGY

## 2021-09-10 RX ORDER — OXYBUTYNIN CHLORIDE 5 MG/1
5 TABLET ORAL 3 TIMES DAILY
Qty: 30 TABLET | Refills: 3 | Status: SHIPPED | OUTPATIENT
Start: 2021-09-10 | End: 2021-09-28 | Stop reason: ALTCHOICE

## 2021-09-10 NOTE — ASSESSMENT & PLAN NOTE
CT on August 9th documents a stone in the lower mid ureter  CT is reviewed with the patient on the [facility] system  The patient has yet to pass the stone and she remains symptomatic  I recommended obtaining a urine culture  We will schedule her for cystoscopy, left retrograde pyelography, left ureteroscopy with laser lithotripsy and left stent placement  Procedure described,  risks discussed and consent form signed  ER precautions discussed

## 2021-09-10 NOTE — H&P (VIEW-ONLY)
Assessment/Plan:    Left ureteral stone  CT on August 9th documents a stone in the lower mid ureter  CT is reviewed with the patient on the [facility] system  The patient has yet to pass the stone and she remains symptomatic  I recommended obtaining a urine culture  We will schedule her for cystoscopy, left retrograde pyelography, left ureteroscopy with laser lithotripsy and left stent placement  Procedure described,  risks discussed and consent form signed  ER precautions discussed  Diagnoses and all orders for this visit:    Calculus of ureter  -     POCT urine dip auto non-scope  -     Urine culture  -     Case request operating room: CYSTOSCOPY URETEROSCOPY WITH LITHOTRIPSY HOLMIUM LASER, RETROGRADE PYELOGRAM AND INSERTION STENT URETERAL; Standing  -     Case request operating room: CYSTOSCOPY URETEROSCOPY WITH LITHOTRIPSY HOLMIUM LASER, RETROGRADE PYELOGRAM AND INSERTION STENT URETERAL    Bladder spasms  -     oxybutynin (DITROPAN) 5 mg tablet; Take 1 tablet (5 mg total) by mouth 3 (three) times a day    Other orders  -     Diet NPO; Sips with meds; Standing  -     Place sequential compression device; Standing          Subjective:      Patient ID: Nirmal Rossi is a 38 y o  female  Chief complaint:  Left ureteral stone    HPI:  28-year-old female who presents today for evaluation of a left ureteral stone  She developed flank pain in August and a CT scan revealed a mid to distal left ureteral stone  She has not passed the stone  She is no longer having flank pain but she notes severe left-sided spasms her bladder  She is voiding in small quantities frequently  There is no fever or dysuria  She has no gross hematuria  She is very uncomfortable and continues to need to take pain medication once to twice a day        The following portions of the patient's history were reviewed and updated as appropriate: allergies, current medications, past family history, past medical history, past social history, past surgical history and problem list     Review of Systems   Constitutional: Negative for activity change, appetite change, fatigue and fever  HENT: Negative  Eyes: Negative  Respiratory: Negative  Cardiovascular: Negative  Gastrointestinal: Negative for blood in stool, constipation, diarrhea and vomiting  Endocrine: Negative  Genitourinary:        See HPI   Musculoskeletal: Negative  Skin: Negative  Allergic/Immunologic: Negative  Neurological: Negative  Hematological: Negative  Psychiatric/Behavioral: Negative  Objective:      /70   Ht 5' 4" (1 626 m)   Wt 68 5 kg (151 lb)   BMI 25 92 kg/m²          Physical Exam  Vitals reviewed  Constitutional:       General: She is not in acute distress  Appearance: Normal appearance  She is well-developed  She is not ill-appearing, toxic-appearing or diaphoretic  HENT:      Head: Normocephalic and atraumatic  Eyes:      General: No scleral icterus  Conjunctiva/sclera: Conjunctivae normal    Cardiovascular:      Rate and Rhythm: Normal rate  Pulmonary:      Effort: Pulmonary effort is normal    Abdominal:      General: There is no distension  Palpations: Abdomen is soft  There is no mass  Tenderness: There is no abdominal tenderness  There is left CVA tenderness  There is no right CVA tenderness, guarding or rebound  Hernia: No hernia is present  Musculoskeletal:         General: Normal range of motion  Cervical back: Neck supple  Skin:     General: Skin is warm and dry  Neurological:      General: No focal deficit present  Mental Status: She is alert and oriented to person, place, and time  Psychiatric:         Mood and Affect: Mood normal          Behavior: Behavior normal          Thought Content:  Thought content normal          Judgment: Judgment normal

## 2021-09-10 NOTE — PATIENT INSTRUCTIONS
Kidney Stones   WHAT YOU NEED TO KNOW:   What is a kidney stone? Kidney stones form in the urinary system when the water and waste in your urine are out of balance  When this happens, certain types of waste crystals separate from the urine  The crystals build up and form kidney stones  Kidney stones can be made of uric acid, calcium, phosphate, or oxalate crystals  You may have more than one kidney stone  What increases my risk for kidney stones? · Not drinking enough liquids (especially water) each day    · Having urinary tract infections often    · Too much of certain foods, such as meat, salt, nuts, and chocolate    · Obesity    · Certain medicines, such as diuretics, steroids, and antacids    · A family history of kidney stones    · Being born with a kidney or bowel disorder    What are the signs and symptoms of kidney stones? · Pain in the middle of your back that moves across to your side or that may spread to your groin    · Nausea and vomiting    · Urge to urinate often, burning feeling when you urinate, or pink or red urine    · Tenderness in your lower back, side, or stomach    How are kidney stones diagnosed? Your healthcare provider will ask about your health and usual foods  He or she may refer you to a urologist  Nurys Willett may need tests to find out what type of kidney stones you have  Tests can show the size of your kidney stones and where they are in your urinary system  You may need more than one of the following:  · Urine tests  may show if you have blood in your urine  They may also show high amounts of the substances that form kidney stones, such as uric acid  · Blood tests  show how well your kidneys are working  They may also be used to check the levels of calcium or uric acid in your blood  · X-ray or ultrasound pictures  may be taken of your kidneys, bladder, and ureters  You may be given contrast liquid before an x-ray to help these show up better in the pictures   You may need to have more than one x-ray  Tell the healthcare provider if you have ever had an allergic reaction to contrast liquid  How are kidney stones treated? · NSAIDs , such as ibuprofen, help decrease swelling, pain, and fever  This medicine is available with or without a doctor's order  NSAIDs can cause stomach bleeding or kidney problems in certain people  If you take blood thinner medicine, always ask your healthcare provider if NSAIDs are safe for you  Always read the medicine label and follow directions  · Acetaminophen  decreases pain and fever  It is available without a doctor's order  Ask how much to take and how often to take it  Follow directions  Read the labels of all other medicines you are using to see if they also contain acetaminophen, or ask your doctor or pharmacist  Acetaminophen can cause liver damage if not taken correctly  Do not use more than 4 grams (4,000 milligrams) total of acetaminophen in one day  · Prescription pain medicine  may be given  Ask your healthcare provider how to take this medicine safely  Some prescription pain medicines contain acetaminophen  Do not take other medicines that contain acetaminophen without talking to your healthcare provider  Too much acetaminophen may cause liver damage  Prescription pain medicine may cause constipation  Ask your healthcare provider how to prevent or treat constipation  · Medicines  to balance your electrolytes may be needed  · A procedure or surgery  to remove the kidney stones may be needed if they do not pass on their own  Your treatment will depend on the size and location of your kidney stones  What can I do to manage kidney stones? · Drink more liquids  Your healthcare provider may tell you to drink at least 8 to 12 (eight-ounce) cups of liquids each day  This helps flush out the kidney stones when you urinate  Water is the best liquid to drink  · Strain your urine every time you go to the bathroom    Urinate through a strainer or a piece of thin cloth to catch the stones  Take the stones to your healthcare provider so they can be sent to the lab for tests  This will help your healthcare providers plan the best treatment for you  · Eat a variety of healthy foods  Healthy foods include fruits, vegetables, whole-grain breads, low-fat dairy products, beans, and fish  You may need to limit how much sodium (salt) or protein you eat  Ask for information about the best foods for you  · Be physically active as directed  Your stones may pass more easily if you stay active  Physical activity can also help you manage your weight  Ask about the best activities for you  When should I seek immediate care? · You are vomiting and it is not relieved with medicine  When should I call my doctor? · You have a fever  · You have trouble urinating  · You see blood in your urine  · You have severe pain  · You have any questions or concerns about your condition or care  CARE AGREEMENT:   You have the right to help plan your care  Learn about your health condition and how it may be treated  Discuss treatment options with your healthcare providers to decide what care you want to receive  You always have the right to refuse treatment  The above information is an  only  It is not intended as medical advice for individual conditions or treatments  Talk to your doctor, nurse or pharmacist before following any medical regimen to see if it is safe and effective for you  © Copyright [employer] 2021 Information is for End User's use only and may not be sold, redistributed or otherwise used for commercial purposes   All illustrations and images included in CareNotes® are the copyrighted property of [employer]  or [address] [place] [address]

## 2021-09-10 NOTE — PROGRESS NOTES
Assessment/Plan:    Left ureteral stone  CT on August 9th documents a stone in the lower mid ureter  CT is reviewed with the patient on the [facility] system  The patient has yet to pass the stone and she remains symptomatic  I recommended obtaining a urine culture  We will schedule her for cystoscopy, left retrograde pyelography, left ureteroscopy with laser lithotripsy and left stent placement  Procedure described,  risks discussed and consent form signed  ER precautions discussed  Diagnoses and all orders for this visit:    Calculus of ureter  -     POCT urine dip auto non-scope  -     Urine culture  -     Case request operating room: CYSTOSCOPY URETEROSCOPY WITH LITHOTRIPSY HOLMIUM LASER, RETROGRADE PYELOGRAM AND INSERTION STENT URETERAL; Standing  -     Case request operating room: CYSTOSCOPY URETEROSCOPY WITH LITHOTRIPSY HOLMIUM LASER, RETROGRADE PYELOGRAM AND INSERTION STENT URETERAL    Bladder spasms  -     oxybutynin (DITROPAN) 5 mg tablet; Take 1 tablet (5 mg total) by mouth 3 (three) times a day    Other orders  -     Diet NPO; Sips with meds; Standing  -     Place sequential compression device; Standing          Subjective:      Patient ID: Hellen Ashby is a 38 y o  female  Chief complaint:  Left ureteral stone    HPI:  26-year-old female who presents today for evaluation of a left ureteral stone  She developed flank pain in August and a CT scan revealed a mid to distal left ureteral stone  She has not passed the stone  She is no longer having flank pain but she notes severe left-sided spasms her bladder  She is voiding in small quantities frequently  There is no fever or dysuria  She has no gross hematuria  She is very uncomfortable and continues to need to take pain medication once to twice a day        The following portions of the patient's history were reviewed and updated as appropriate: allergies, current medications, past family history, past medical history, past social history, past surgical history and problem list     Review of Systems   Constitutional: Negative for activity change, appetite change, fatigue and fever  HENT: Negative  Eyes: Negative  Respiratory: Negative  Cardiovascular: Negative  Gastrointestinal: Negative for blood in stool, constipation, diarrhea and vomiting  Endocrine: Negative  Genitourinary:        See HPI   Musculoskeletal: Negative  Skin: Negative  Allergic/Immunologic: Negative  Neurological: Negative  Hematological: Negative  Psychiatric/Behavioral: Negative  Objective:      /70   Ht 5' 4" (1 626 m)   Wt 68 5 kg (151 lb)   BMI 25 92 kg/m²          Physical Exam  Vitals reviewed  Constitutional:       General: She is not in acute distress  Appearance: Normal appearance  She is well-developed  She is not ill-appearing, toxic-appearing or diaphoretic  HENT:      Head: Normocephalic and atraumatic  Eyes:      General: No scleral icterus  Conjunctiva/sclera: Conjunctivae normal    Cardiovascular:      Rate and Rhythm: Normal rate  Pulmonary:      Effort: Pulmonary effort is normal    Abdominal:      General: There is no distension  Palpations: Abdomen is soft  There is no mass  Tenderness: There is no abdominal tenderness  There is left CVA tenderness  There is no right CVA tenderness, guarding or rebound  Hernia: No hernia is present  Musculoskeletal:         General: Normal range of motion  Cervical back: Neck supple  Skin:     General: Skin is warm and dry  Neurological:      General: No focal deficit present  Mental Status: She is alert and oriented to person, place, and time  Psychiatric:         Mood and Affect: Mood normal          Behavior: Behavior normal          Thought Content:  Thought content normal          Judgment: Judgment normal

## 2021-09-10 NOTE — H&P
Assessment/Plan:    Left ureteral stone  CT on August 9th documents a stone in the lower mid ureter  CT is reviewed with the patient on the [facility] system  The patient has yet to pass the stone and she remains symptomatic  I recommended obtaining a urine culture  We will schedule her for cystoscopy, left retrograde pyelography, left ureteroscopy with laser lithotripsy and left stent placement  Procedure described,  risks discussed and consent form signed  ER precautions discussed  Diagnoses and all orders for this visit:    Calculus of ureter  -     POCT urine dip auto non-scope  -     Urine culture  -     Case request operating room: CYSTOSCOPY URETEROSCOPY WITH LITHOTRIPSY HOLMIUM LASER, RETROGRADE PYELOGRAM AND INSERTION STENT URETERAL; Standing  -     Case request operating room: CYSTOSCOPY URETEROSCOPY WITH LITHOTRIPSY HOLMIUM LASER, RETROGRADE PYELOGRAM AND INSERTION STENT URETERAL    Bladder spasms  -     oxybutynin (DITROPAN) 5 mg tablet; Take 1 tablet (5 mg total) by mouth 3 (three) times a day    Other orders  -     Diet NPO; Sips with meds; Standing  -     Place sequential compression device; Standing          Subjective:      Patient ID: Melvin Hansen is a 38 y o  female  Chief complaint:  Left ureteral stone    HPI:  65-year-old female who presents today for evaluation of a left ureteral stone  She developed flank pain in August and a CT scan revealed a mid to distal left ureteral stone  She has not passed the stone  She is no longer having flank pain but she notes severe left-sided spasms her bladder  She is voiding in small quantities frequently  There is no fever or dysuria  She has no gross hematuria  She is very uncomfortable and continues to need to take pain medication once to twice a day        The following portions of the patient's history were reviewed and updated as appropriate: allergies, current medications, past family history, past medical history, past social history, past surgical history and problem list     Review of Systems   Constitutional: Negative for activity change, appetite change, fatigue and fever  HENT: Negative  Eyes: Negative  Respiratory: Negative  Cardiovascular: Negative  Gastrointestinal: Negative for blood in stool, constipation, diarrhea and vomiting  Endocrine: Negative  Genitourinary:        See HPI   Musculoskeletal: Negative  Skin: Negative  Allergic/Immunologic: Negative  Neurological: Negative  Hematological: Negative  Psychiatric/Behavioral: Negative  Objective:      /70   Ht 5' 4" (1 626 m)   Wt 68 5 kg (151 lb)   BMI 25 92 kg/m²          Physical Exam  Vitals reviewed  Constitutional:       General: She is not in acute distress  Appearance: Normal appearance  She is well-developed  She is not ill-appearing, toxic-appearing or diaphoretic  HENT:      Head: Normocephalic and atraumatic  Eyes:      General: No scleral icterus  Conjunctiva/sclera: Conjunctivae normal    Cardiovascular:      Rate and Rhythm: Normal rate  Pulmonary:      Effort: Pulmonary effort is normal    Abdominal:      General: There is no distension  Palpations: Abdomen is soft  There is no mass  Tenderness: There is no abdominal tenderness  There is left CVA tenderness  There is no right CVA tenderness, guarding or rebound  Hernia: No hernia is present  Musculoskeletal:         General: Normal range of motion  Cervical back: Neck supple  Skin:     General: Skin is warm and dry  Neurological:      General: No focal deficit present  Mental Status: She is alert and oriented to person, place, and time  Psychiatric:         Mood and Affect: Mood normal          Behavior: Behavior normal          Thought Content:  Thought content normal          Judgment: Judgment normal

## 2021-09-11 LAB — BACTERIA UR CULT: NORMAL

## 2021-09-13 ENCOUNTER — ANESTHESIA EVENT (OUTPATIENT)
Dept: PERIOP | Facility: HOSPITAL | Age: 41
End: 2021-09-13
Payer: COMMERCIAL

## 2021-09-13 ENCOUNTER — TELEPHONE (OUTPATIENT)
Dept: OTHER | Facility: OTHER | Age: 41
End: 2021-09-13

## 2021-09-13 ENCOUNTER — ANESTHESIA (OUTPATIENT)
Dept: PERIOP | Facility: HOSPITAL | Age: 41
End: 2021-09-13
Payer: COMMERCIAL

## 2021-09-13 ENCOUNTER — HOSPITAL ENCOUNTER (OUTPATIENT)
Facility: HOSPITAL | Age: 41
Setting detail: OUTPATIENT SURGERY
Discharge: HOME | End: 2021-09-13
Attending: UROLOGY | Admitting: UROLOGY
Payer: COMMERCIAL

## 2021-09-13 ENCOUNTER — APPOINTMENT (OUTPATIENT)
Dept: RADIOLOGY | Facility: HOSPITAL | Age: 41
End: 2021-09-13
Payer: COMMERCIAL

## 2021-09-13 VITALS
SYSTOLIC BLOOD PRESSURE: 112 MMHG | TEMPERATURE: 98.8 F | DIASTOLIC BLOOD PRESSURE: 60 MMHG | HEART RATE: 52 BPM | OXYGEN SATURATION: 99 % | RESPIRATION RATE: 16 BRPM

## 2021-09-13 DIAGNOSIS — N20.1 CALCULUS OF URETER: ICD-10-CM

## 2021-09-13 LAB
EXT PREGNANCY TEST URINE: NEGATIVE
EXT. CONTROL: NORMAL

## 2021-09-13 PROCEDURE — C1758 CATHETER, URETERAL: HCPCS | Performed by: UROLOGY

## 2021-09-13 PROCEDURE — 52352 CYSTOURETERO W/STONE REMOVE: CPT | Performed by: UROLOGY

## 2021-09-13 PROCEDURE — C1769 GUIDE WIRE: HCPCS | Performed by: UROLOGY

## 2021-09-13 PROCEDURE — 82360 CALCULUS ASSAY QUANT: CPT | Performed by: UROLOGY

## 2021-09-13 PROCEDURE — 74420 UROGRAPHY RTRGR +-KUB: CPT

## 2021-09-13 PROCEDURE — 52332 CYSTOSCOPY AND TREATMENT: CPT | Performed by: UROLOGY

## 2021-09-13 PROCEDURE — 81025 URINE PREGNANCY TEST: CPT | Performed by: UROLOGY

## 2021-09-13 PROCEDURE — C2617 STENT, NON-COR, TEM W/O DEL: HCPCS | Performed by: UROLOGY

## 2021-09-13 RX ORDER — KETOROLAC TROMETHAMINE 30 MG/ML
30 INJECTION, SOLUTION INTRAMUSCULAR; INTRAVENOUS EVERY 6 HOURS SCHEDULED
Status: DISCONTINUED | OUTPATIENT
Start: 2021-09-13 | End: 2021-09-13 | Stop reason: HOSPADM

## 2021-09-13 RX ORDER — LIDOCAINE HYDROCHLORIDE 10 MG/ML
INJECTION, SOLUTION EPIDURAL; INFILTRATION; INTRACAUDAL; PERINEURAL AS NEEDED
Status: DISCONTINUED | OUTPATIENT
Start: 2021-09-13 | End: 2021-09-13

## 2021-09-13 RX ORDER — DEXAMETHASONE SODIUM PHOSPHATE 4 MG/ML
INJECTION, SOLUTION INTRA-ARTICULAR; INTRALESIONAL; INTRAMUSCULAR; INTRAVENOUS; SOFT TISSUE AS NEEDED
Status: DISCONTINUED | OUTPATIENT
Start: 2021-09-13 | End: 2021-09-13

## 2021-09-13 RX ORDER — PHENAZOPYRIDINE HYDROCHLORIDE 100 MG/1
200 TABLET, FILM COATED ORAL ONCE AS NEEDED
Status: DISCONTINUED | OUTPATIENT
Start: 2021-09-13 | End: 2021-09-13 | Stop reason: HOSPADM

## 2021-09-13 RX ORDER — PROPOFOL 10 MG/ML
INJECTION, EMULSION INTRAVENOUS AS NEEDED
Status: DISCONTINUED | OUTPATIENT
Start: 2021-09-13 | End: 2021-09-13

## 2021-09-13 RX ORDER — FENTANYL CITRATE/PF 50 MCG/ML
50 SYRINGE (ML) INJECTION
Status: DISCONTINUED | OUTPATIENT
Start: 2021-09-13 | End: 2021-09-13 | Stop reason: HOSPADM

## 2021-09-13 RX ORDER — SODIUM CHLORIDE, SODIUM LACTATE, POTASSIUM CHLORIDE, CALCIUM CHLORIDE 600; 310; 30; 20 MG/100ML; MG/100ML; MG/100ML; MG/100ML
125 INJECTION, SOLUTION INTRAVENOUS CONTINUOUS
Status: DISCONTINUED | OUTPATIENT
Start: 2021-09-13 | End: 2021-09-13 | Stop reason: HOSPADM

## 2021-09-13 RX ORDER — CEFAZOLIN SODIUM 2 G/50ML
SOLUTION INTRAVENOUS AS NEEDED
Status: DISCONTINUED | OUTPATIENT
Start: 2021-09-13 | End: 2021-09-13

## 2021-09-13 RX ORDER — MAGNESIUM HYDROXIDE 1200 MG/15ML
LIQUID ORAL AS NEEDED
Status: DISCONTINUED | OUTPATIENT
Start: 2021-09-13 | End: 2021-09-13 | Stop reason: HOSPADM

## 2021-09-13 RX ORDER — ONDANSETRON 2 MG/ML
INJECTION INTRAMUSCULAR; INTRAVENOUS AS NEEDED
Status: DISCONTINUED | OUTPATIENT
Start: 2021-09-13 | End: 2021-09-13

## 2021-09-13 RX ORDER — ONDANSETRON 2 MG/ML
4 INJECTION INTRAMUSCULAR; INTRAVENOUS EVERY 6 HOURS PRN
Status: DISCONTINUED | OUTPATIENT
Start: 2021-09-13 | End: 2021-09-13 | Stop reason: HOSPADM

## 2021-09-13 RX ORDER — FENTANYL CITRATE 50 UG/ML
INJECTION, SOLUTION INTRAMUSCULAR; INTRAVENOUS AS NEEDED
Status: DISCONTINUED | OUTPATIENT
Start: 2021-09-13 | End: 2021-09-13

## 2021-09-13 RX ORDER — OXYCODONE HYDROCHLORIDE 5 MG/1
5 TABLET ORAL EVERY 4 HOURS PRN
Status: DISCONTINUED | OUTPATIENT
Start: 2021-09-13 | End: 2021-09-13 | Stop reason: HOSPADM

## 2021-09-13 RX ORDER — TAMSULOSIN HYDROCHLORIDE 0.4 MG/1
0.4 CAPSULE ORAL EVERY EVENING
Qty: 10 CAPSULE | Refills: 0 | Status: SHIPPED | OUTPATIENT
Start: 2021-09-13 | End: 2021-09-28 | Stop reason: ALTCHOICE

## 2021-09-13 RX ORDER — ACETAMINOPHEN 325 MG/1
650 TABLET ORAL EVERY 6 HOURS PRN
Status: DISCONTINUED | OUTPATIENT
Start: 2021-09-13 | End: 2021-09-13 | Stop reason: HOSPADM

## 2021-09-13 RX ADMIN — CEFAZOLIN SODIUM 2000 MG: 2 SOLUTION INTRAVENOUS at 16:00

## 2021-09-13 RX ADMIN — FENTANYL CITRATE 50 MCG: 50 INJECTION, SOLUTION INTRAMUSCULAR; INTRAVENOUS at 16:51

## 2021-09-13 RX ADMIN — DEXAMETHASONE SODIUM PHOSPHATE 4 MG: 4 INJECTION, SOLUTION INTRAMUSCULAR; INTRAVENOUS at 16:11

## 2021-09-13 RX ADMIN — FENTANYL CITRATE 50 MCG: 50 INJECTION, SOLUTION INTRAMUSCULAR; INTRAVENOUS at 17:01

## 2021-09-13 RX ADMIN — SODIUM CHLORIDE, SODIUM LACTATE, POTASSIUM CHLORIDE, AND CALCIUM CHLORIDE 125 ML/HR: .6; .31; .03; .02 INJECTION, SOLUTION INTRAVENOUS at 17:26

## 2021-09-13 RX ADMIN — FENTANYL CITRATE 50 MCG: 50 INJECTION, SOLUTION INTRAMUSCULAR; INTRAVENOUS at 16:08

## 2021-09-13 RX ADMIN — LIDOCAINE HYDROCHLORIDE 50 MG: 10 INJECTION, SOLUTION EPIDURAL; INFILTRATION; INTRACAUDAL; PERINEURAL at 16:11

## 2021-09-13 RX ADMIN — ONDANSETRON 4 MG: 2 INJECTION INTRAMUSCULAR; INTRAVENOUS at 16:18

## 2021-09-13 RX ADMIN — SODIUM CHLORIDE, SODIUM LACTATE, POTASSIUM CHLORIDE, AND CALCIUM CHLORIDE: .6; .31; .03; .02 INJECTION, SOLUTION INTRAVENOUS at 15:50

## 2021-09-13 RX ADMIN — PROPOFOL 200 MG: 10 INJECTION, EMULSION INTRAVENOUS at 16:11

## 2021-09-13 RX ADMIN — KETOROLAC TROMETHAMINE 30 MG: 30 INJECTION, SOLUTION INTRAMUSCULAR; INTRAVENOUS at 17:05

## 2021-09-13 RX ADMIN — OXYCODONE HYDROCHLORIDE 5 MG: 5 TABLET ORAL at 17:50

## 2021-09-13 RX ADMIN — FENTANYL CITRATE 50 MCG: 50 INJECTION, SOLUTION INTRAMUSCULAR; INTRAVENOUS at 17:15

## 2021-09-13 NOTE — TELEPHONE ENCOUNTER
Called pt, per Dr Shavonne Galicia they are working on the schedule, making sure they have the laser, and they will call her soon

## 2021-09-13 NOTE — TELEPHONE ENCOUNTER
"I am scheduled to have a procedure with Dr Lata Chavez today  No one called with a time "     Clinical staff, please call patient

## 2021-09-13 NOTE — DISCHARGE INSTRUCTIONS
Ureteroscopy   WHAT YOU NEED TO KNOW:   A ureteroscopy is a procedure to examine in the inside of your urinary tract, which includes your urethra, bladder, ureters, and kidneys  A ureteroscope is a small, thin tube with a light and camera on the end  Ureteroscopy can help your healthcare provider diagnose and treat problems in your urinary tract, such as kidney stones  DISCHARGE INSTRUCTIONS:   Medicine:   · Antibiotics  may be given to treat or prevent an infection  · Take your medicine as directed  Contact your healthcare provider if you think your medicine is not helping or if you have side effects  Tell him or her if you are allergic to any medicine  Keep a list of the medicines, vitamins, and herbs you take  Include the amounts, and when and why you take them  Bring the list or the pill bottles to follow-up visits  Carry your medicine list with you in case of an emergency  Follow up with your healthcare provider as directed:  Write down your questions so you remember to ask them during your visits  Drink liquids as directed  Liquids can help prevent kidney stones and urinary tract infections  Drink water and limit the amount of caffeine you drink  Caffeine may be found in coffee, tea, soda, sports drinks, and foods  Ask your healthcare provider how much liquid to drink each day  Contact your healthcare provider if:   · You have a fever  · You cannot urinate  · You have blood clots in your urine that interfere with urination  · You are vomiting  · You have pain in your abdomen or side  · You have questions or concerns about your condition or care  © Copyright [employer] 2018 Information is for End User's use only and may not be sold, redistributed or otherwise used for commercial purposes  All illustrations and images included in CareNotes® are the copyrighted property of [employer]  or [facility] Jamal Cruz  The above information is an  only   It is not intended as medical advice for individual conditions or treatments  Talk to your doctor, nurse or pharmacist before following any medical regimen to see if it is safe and effective for you

## 2021-09-13 NOTE — ANESTHESIA PREPROCEDURE EVALUATION
Procedure:  CYSTOSCOPY URETEROSCOPY WITH LITHOTRIPSY HOLMIUM LASER, RETROGRADE PYELOGRAM AND INSERTION STENT URETERAL (Left Ureter)    Relevant Problems   /RENAL   (+) Calculus of kidney      NEURO/PSYCH   (+) Anxiety   (+) PTSD (post-traumatic stress disorder)        Physical Exam    Airway    Mallampati score: II  TM Distance: >3 FB  Neck ROM: full     Dental   No notable dental hx     Cardiovascular  Cardiovascular exam normal    Pulmonary  Pulmonary exam normal     Other Findings        Anesthesia Plan  ASA Score- 2     Anesthesia Type- general with ASA Monitors  Additional Monitors:   Airway Plan: LMA  Plan Factors-Exercise tolerance (METS): >4 METS  Chart reviewed  Patient is a current smoker  Patient instructed to abstain from smoking on day of procedure  Patient smoked on day of surgery  Induction- intravenous  Postoperative Plan-     Informed Consent- Anesthetic plan and risks discussed with patient  I personally reviewed this patient with the CRNA  Discussed and agreed on the Anesthesia Plan with the CRNA  Nery Edwards

## 2021-09-13 NOTE — OP NOTE
OPERATIVE REPORT  PATIENT NAME: Matthew Crespo    :  1980  MRN: [id number]  Pt Location:  OR ROOM 10    SURGERY DATE: 2021    Surgeon(s) and Role:     * Bharat Blair MD - Primary    Preop Diagnosis:  Calculus of ureter [N20 1]    Post-Op Diagnosis Codes:     * Calculus of ureter [N20 1]    Procedure(s) (LRB):  CYSTOSCOPY URETEROSCOPY, RETROGRADE PYELOGRAM AND INSERTION STENT URETERAL (Left)    Specimen(s):  ID Type Source Tests Collected by Time Destination   A : left ureter stone Calculus Urinary Bladder STONE ANALYSIS Bharat Blair MD 2021 [id number]    Stone was removed from the left ureter  Estimated Blood Loss:   Minimal    Drains:  Ureteral Drain/Stent Left ureter 6 Fr  (Active)   Number of days: 515       Anesthesia Type:   Choice    Operative Indications:  Calculus of ureter [N20 1]      Operative Findings:  Left distal ureteral stone successfully basket extracted  The bladder and ureteral orifices are  normal   Left retrograde pyelogram confirmed a filling defect in the distal left ureter  No other filling defects or stones were identified  A 6 Afghan stent was left at the conclusion the procedure with Marcelaer left long so the stent can be removed by the nursing staff in approximately 5 days  Complications:   None    Procedure and Technique:  PLAN FOR STENT:  Removal by nursing staff in approximately 5 days      The patient was brought into the OR, properly identified and positioned on the table  General anesthesia was administered and the patient was placed in lithotomy position and prepped and draped in the usual sterile fashion  Compression boots were employed  Intravenous antibiotic was administered  An appropriate time-out was performed  Cystoscopy was performed with a 25 Afghan cystoscope with findings as above  The left ureteral orifice was identified and retrograde pyelogram performed with findings as above    A guidewire was placed up the ureter under fluoroscopic guidance  The rigid ureteroscope was introduced and carefully advanced up to stone  A basket was placed thru the scope and carefully engaged the stone  The scope was withdrawn, bringing the stone out, with no trauma to the ureter  Ureteroscopy was then again performed up into the upper ureter  Contrast was injected and no filling defects or extravasation noted  The ureteroscope  was removed from the ureter  No damage was noted to the ureter nor were there any additional ureteral stones  The cystoscope was used to place a 6F Contour VL stent in the ureter, with the upper coils in the renal pelvis, and the distal coil in the bladder  The patient was awaken from anesthesia and taken to the PACU in good condition     I was present for the entire procedure    Patient Disposition:  PACU     SIGNATURE: Darcy Blackwood MD  DATE: September 13, 2021  TIME: 4:41 PM

## 2021-09-13 NOTE — NURSING NOTE
String from stent noted  Voided blood tinged urine  Strained  No stones noted  IV removed  Ready to go home

## 2021-09-13 NOTE — INTERVAL H&P NOTE
H&P reviewed  After examining the patient I find no changes in the patients condition since the H&P had been written  Vitals:    09/13/21 1416   BP: 106/58   Pulse: 66   Resp: 18   Temp: 97 6 °F (36 4 °C)   SpO2: 96%   The patient remains symptomatic  The procedure was reviewed with the patient in the holding area  Risks were again discussed  Laterality marked-left

## 2021-09-13 NOTE — ANESTHESIA POSTPROCEDURE EVALUATION
Post-Op Assessment Note    CV Status:  Stable  Pain Score: 0    Pain management: adequate     Mental Status:  Alert   Hydration Status:  Stable   PONV Controlled:  Controlled   Airway Patency:  Patent      Post Op Vitals Reviewed: Yes      Staff: CRNA         No complications documented      BP   125/73   Temp      Pulse 81   Resp 20   SpO2 99

## 2021-09-14 ENCOUNTER — TELEPHONE (OUTPATIENT)
Dept: UROLOGY | Facility: MEDICAL CENTER | Age: 41
End: 2021-09-14

## 2021-09-14 DIAGNOSIS — N20.1 LEFT URETERAL STONE: ICD-10-CM

## 2021-09-14 RX ORDER — OXYCODONE HYDROCHLORIDE 5 MG/1
5 TABLET ORAL EVERY 6 HOURS PRN
Qty: 6 TABLET | Refills: 0 | Status: SHIPPED | OUTPATIENT
Start: 2021-09-14 | End: 2021-09-28 | Stop reason: ALTCHOICE

## 2021-09-14 NOTE — TELEPHONE ENCOUNTER
I think the patient will be removing her own stent  She can follow up in 6-8   I do not think she needs an ultrasound prior as this was a simple ureteroscopy with stone extraction  She can follow up with the AP

## 2021-09-14 NOTE — TELEPHONE ENCOUNTER
Post Op Note    Barbara Guzman is a 38 y o  female s/pCYSTOSCOPY URETEROSCOPY, RETROGRADE PYELOGRAM AND INSERTION STENT URETERAL (Left Ureter)  performed 9-  Barbara Guzman is a patient of Dr Ashia Johnson and is seen at the [facility] office  How would you rate your pain on a scale from 1 to 10, 10 being the worst pain ever? 8  Have you had a fever? No  Do you have any difficulty urinating? No    Do you have any other questions or concerns that I can address at this time? Call placed to patient to see how she was doing s/p surgery yesterday  Pt states that she "is in misery" and that she is having pain and is uncomfortable  She is taking the pain medication as prescribed  She is asking if she can have a refill of Oxycodone because she will run out Thursday and the stent is not scheduled to come out until Saturday  Pt stated that she will be removing her own stent on Saturday as she does not want to wait until Monday when the office opens to remove h er stent  She wants it out as soon as possible  ER Precautions were reviewed with the patient in the event she has any issues or concerns with removing this stent over the weekend  Pt verbalized her understandings of these recommendations

## 2021-09-14 NOTE — TELEPHONE ENCOUNTER
Call placed to patient and spoke with her  Informed her of the recommendations of Dr Familia Burkett  Pt is aware, but stated that she "pays a high price for her medical care and only wants to be seen by Dr Familia Burkett since he did the work"  Pt refuses to see AP or CRNP at this time  She wants her follow up scheduled with Dr Familia Burkett

## 2021-09-14 NOTE — TELEPHONE ENCOUNTER
I will prescribe additional OxyContin  Please call the patient and instruct her to take Tylenol around the clock, and Motrin 600 mg 3 to 4 times a day prior to using the narcotic

## 2021-09-15 NOTE — TELEPHONE ENCOUNTER
Call placed to patient  Offered her 1st available opening with Dr Mylene Elena for 11- at 3:45pm  Pt confirmed this appointment

## 2021-09-18 ENCOUNTER — HOSPITAL ENCOUNTER (EMERGENCY)
Facility: HOSPITAL | Age: 41
Discharge: HOME | End: 2021-09-18
Attending: EMERGENCY MEDICINE
Payer: COMMERCIAL

## 2021-09-18 ENCOUNTER — APPOINTMENT (EMERGENCY)
Dept: CT IMAGING | Facility: HOSPITAL | Age: 41
End: 2021-09-18
Payer: COMMERCIAL

## 2021-09-18 VITALS
HEART RATE: 60 BPM | RESPIRATION RATE: 20 BRPM | BODY MASS INDEX: 26.59 KG/M2 | TEMPERATURE: 97.9 F | OXYGEN SATURATION: 99 % | DIASTOLIC BLOOD PRESSURE: 67 MMHG | WEIGHT: 154.9 LBS | SYSTOLIC BLOOD PRESSURE: 115 MMHG

## 2021-09-18 DIAGNOSIS — R10.9 LEFT FLANK PAIN: Primary | ICD-10-CM

## 2021-09-18 DIAGNOSIS — N20.1 LEFT URETERAL STONE: ICD-10-CM

## 2021-09-18 DIAGNOSIS — N39.0 UTI (URINARY TRACT INFECTION): ICD-10-CM

## 2021-09-18 LAB
ANION GAP SERPL CALCULATED.3IONS-SCNC: 11 MMOL/L (ref 5–14)
BACTERIA UR QL AUTO: ABNORMAL /HPF
BASOPHILS # BLD AUTO: 0 THOUSANDS/ΜL (ref 0–0.1)
BASOPHILS NFR BLD AUTO: 0 % (ref 0–1)
BILIRUB UR QL STRIP: NEGATIVE
BUN SERPL-MCNC: 17 MG/DL (ref 5–25)
CALCIUM SERPL-MCNC: 9.4 MG/DL (ref 8.4–10.2)
CHLORIDE SERPL-SCNC: 107 MMOL/L (ref 97–108)
CLARITY UR: ABNORMAL
CO2 SERPL-SCNC: 22 MMOL/L (ref 22–30)
COLOR UR: ABNORMAL
CREAT SERPL-MCNC: 0.91 MG/DL (ref 0.6–1.2)
EOSINOPHIL # BLD AUTO: 0.1 THOUSAND/ΜL (ref 0–0.4)
EOSINOPHIL NFR BLD AUTO: 1 % (ref 0–6)
ERYTHROCYTE [DISTWIDTH] IN BLOOD BY AUTOMATED COUNT: 12.8 %
EXT PREG TEST URINE: NEGATIVE
EXT. CONTROL ED NAV: NORMAL
GFR SERPL CREATININE-BSD FRML MDRD: 79 ML/MIN/1.73SQ M
GLUCOSE SERPL-MCNC: 96 MG/DL (ref 70–99)
GLUCOSE UR STRIP-MCNC: NEGATIVE MG/DL
HCT VFR BLD AUTO: 40.8 % (ref 36–46)
HGB BLD-MCNC: 13.7 G/DL (ref 12–16)
HGB UR QL STRIP.AUTO: 250
KETONES UR STRIP-MCNC: NEGATIVE MG/DL
LACTATE SERPL-SCNC: 1.7 MMOL/L (ref 0.7–2)
LEUKOCYTE ESTERASE UR QL STRIP: 100
LIPASE SERPL-CCNC: 34 U/L (ref 23–300)
LYMPHOCYTES # BLD AUTO: 1.3 THOUSANDS/ΜL (ref 0.5–4)
LYMPHOCYTES NFR BLD AUTO: 13 % (ref 25–45)
MCH RBC QN AUTO: 30.5 PG (ref 26–34)
MCHC RBC AUTO-ENTMCNC: 33.5 G/DL (ref 31–36)
MCV RBC AUTO: 91 FL (ref 80–100)
MONOCYTES # BLD AUTO: 0.5 THOUSAND/ΜL (ref 0.2–0.9)
MONOCYTES NFR BLD AUTO: 6 % (ref 1–10)
NEUTROPHILS # BLD AUTO: 7.9 THOUSANDS/ΜL (ref 1.8–7.8)
NEUTS SEG NFR BLD AUTO: 80 % (ref 45–65)
NITRITE UR QL STRIP: POSITIVE
NON-SQ EPI CELLS URNS QL MICRO: ABNORMAL /HPF
PH UR STRIP.AUTO: 5 [PH]
PLATELET # BLD AUTO: 252 THOUSANDS/UL (ref 150–450)
PMV BLD AUTO: 9.7 FL (ref 8.9–12.7)
POTASSIUM SERPL-SCNC: 3.8 MMOL/L (ref 3.6–5)
PROT UR STRIP-MCNC: ABNORMAL MG/DL
RBC # BLD AUTO: 4.48 MILLION/UL (ref 4–5.2)
RBC #/AREA URNS AUTO: ABNORMAL /HPF
SODIUM SERPL-SCNC: 140 MMOL/L (ref 137–147)
SP GR UR STRIP.AUTO: 1.02 (ref 1–1.04)
UROBILINOGEN UA: 1 MG/DL
WBC # BLD AUTO: 9.9 THOUSAND/UL (ref 4.5–11)
WBC #/AREA URNS AUTO: ABNORMAL /HPF

## 2021-09-18 PROCEDURE — 96376 TX/PRO/DX INJ SAME DRUG ADON: CPT

## 2021-09-18 PROCEDURE — 85025 COMPLETE CBC W/AUTO DIFF WBC: CPT | Performed by: EMERGENCY MEDICINE

## 2021-09-18 PROCEDURE — 96361 HYDRATE IV INFUSION ADD-ON: CPT

## 2021-09-18 PROCEDURE — 74177 CT ABD & PELVIS W/CONTRAST: CPT

## 2021-09-18 PROCEDURE — 83605 ASSAY OF LACTIC ACID: CPT | Performed by: EMERGENCY MEDICINE

## 2021-09-18 PROCEDURE — 36415 COLL VENOUS BLD VENIPUNCTURE: CPT | Performed by: EMERGENCY MEDICINE

## 2021-09-18 PROCEDURE — 80048 BASIC METABOLIC PNL TOTAL CA: CPT | Performed by: EMERGENCY MEDICINE

## 2021-09-18 PROCEDURE — 83690 ASSAY OF LIPASE: CPT | Performed by: EMERGENCY MEDICINE

## 2021-09-18 PROCEDURE — 96375 TX/PRO/DX INJ NEW DRUG ADDON: CPT

## 2021-09-18 PROCEDURE — 81025 URINE PREGNANCY TEST: CPT | Performed by: EMERGENCY MEDICINE

## 2021-09-18 PROCEDURE — 81001 URINALYSIS AUTO W/SCOPE: CPT | Performed by: EMERGENCY MEDICINE

## 2021-09-18 PROCEDURE — 96365 THER/PROPH/DIAG IV INF INIT: CPT

## 2021-09-18 PROCEDURE — 99285 EMERGENCY DEPT VISIT HI MDM: CPT | Performed by: EMERGENCY MEDICINE

## 2021-09-18 PROCEDURE — 99284 EMERGENCY DEPT VISIT MOD MDM: CPT

## 2021-09-18 RX ORDER — KETOROLAC TROMETHAMINE 30 MG/ML
15 INJECTION, SOLUTION INTRAMUSCULAR; INTRAVENOUS ONCE
Status: COMPLETED | OUTPATIENT
Start: 2021-09-18 | End: 2021-09-18

## 2021-09-18 RX ORDER — CEPHALEXIN 250 MG/1
250 CAPSULE ORAL EVERY 6 HOURS SCHEDULED
Qty: 28 CAPSULE | Refills: 0 | Status: SHIPPED | OUTPATIENT
Start: 2021-09-18 | End: 2021-09-25

## 2021-09-18 RX ORDER — CEFTRIAXONE 1 G/50ML
1000 INJECTION, SOLUTION INTRAVENOUS ONCE
Status: COMPLETED | OUTPATIENT
Start: 2021-09-18 | End: 2021-09-18

## 2021-09-18 RX ORDER — HYDROMORPHONE HCL/PF 1 MG/ML
0.5 SYRINGE (ML) INJECTION ONCE
Status: COMPLETED | OUTPATIENT
Start: 2021-09-18 | End: 2021-09-18

## 2021-09-18 RX ORDER — OXYCODONE HYDROCHLORIDE 5 MG/1
5 TABLET ORAL EVERY 6 HOURS PRN
Qty: 6 TABLET | Refills: 0 | Status: SHIPPED | OUTPATIENT
Start: 2021-09-18 | End: 2021-09-28 | Stop reason: ALTCHOICE

## 2021-09-18 RX ORDER — ONDANSETRON 2 MG/ML
4 INJECTION INTRAMUSCULAR; INTRAVENOUS ONCE
Status: COMPLETED | OUTPATIENT
Start: 2021-09-18 | End: 2021-09-18

## 2021-09-18 RX ADMIN — IOHEXOL 100 ML: 350 INJECTION, SOLUTION INTRAVENOUS at 13:56

## 2021-09-18 RX ADMIN — SODIUM CHLORIDE 1000 ML: 0.9 INJECTION, SOLUTION INTRAVENOUS at 12:47

## 2021-09-18 RX ADMIN — HYDROMORPHONE HYDROCHLORIDE 0.5 MG: 1 INJECTION, SOLUTION INTRAMUSCULAR; INTRAVENOUS; SUBCUTANEOUS at 12:46

## 2021-09-18 RX ADMIN — KETOROLAC TROMETHAMINE 15 MG: 30 INJECTION, SOLUTION INTRAMUSCULAR; INTRAVENOUS at 15:42

## 2021-09-18 RX ADMIN — HYDROMORPHONE HYDROCHLORIDE 0.5 MG: 1 INJECTION, SOLUTION INTRAMUSCULAR; INTRAVENOUS; SUBCUTANEOUS at 14:44

## 2021-09-18 RX ADMIN — ONDANSETRON 4 MG: 2 INJECTION INTRAMUSCULAR; INTRAVENOUS at 13:07

## 2021-09-18 RX ADMIN — CEFTRIAXONE 1000 MG: 1 INJECTION, SOLUTION INTRAVENOUS at 14:43

## 2021-09-18 NOTE — ED PROVIDER NOTES
History  Chief Complaint   Patient presents with    Flank Pain     pt c/o left sided "kidney" pain after removing her stent at home      Aubrie Meeks is an 38y o  year old female with PMHx significant for substance abuse, DVT, nephrolithiasis, who presents to the ED today with sudden onset L lower back pain and LLQ pain for a few hours since one of her multiple L ureteral stents was removed  Abdominal pain is exacerbated by palpation and movement and relieved by nothing  The pain is sharp in quality, does radiate from the back to the LLQ, and currently rated severe in severity  Has tried prescription pain medications for pain  Patient also endorses chills, nausea, and hematuria that all started at the same time as the pain  The patient denies lightheadedness or syncope, vomiting, chest pain, shortness of breath, cough, changes in usual bowel movements, pain anywhere else in body  History provided by:  Medical records and patient      Prior to Admission Medications   Prescriptions Last Dose Informant Patient Reported? Taking?    Multiple Vitamin (ONE-A-DAY ADULT VITACRAVES+DHA PO)  Self Yes Yes   Sig: Take by mouth   hydrOXYzine HCL (ATARAX) 25 mg tablet   No Yes   Sig: Take 1 tablet (25 mg total) by mouth every 6 (six) hours as needed for itching   medroxyPROGESTERone acetate (DEPO-PROVERA SYRINGE) 150 mg/mL injection  Self Yes Yes   Sig: Inject 150 mg into a muscle every 3 (three) months    oxyCODONE (ROXICODONE) 5 mg immediate release tablet   No Yes   Sig: Take 1 tablet (5 mg total) by mouth every 6 (six) hours as needed for moderate painMax Daily Amount: 20 mg   oxybutynin (DITROPAN) 5 mg tablet   No Yes   Sig: Take 1 tablet (5 mg total) by mouth 3 (three) times a day   tamsulosin (FLOMAX) 0 4 mg   No Yes   Sig: Take 1 capsule (0 4 mg total) by mouth daily with dinner   tamsulosin (FLOMAX) 0 4 mg   No Yes   Sig: Take 1 capsule (0 4 mg total) by mouth every evening      Facility-Administered Medications: None       Past Medical History:   Diagnosis Date    Abdominal pain     Acute deep vein thrombosis (DVT) of left lower extremity (HCC)     Depression     Distorted vision     Feeling of incomplete bladder emptying     Kidney stone     Nocturia     Otitis media     Stress incontinence of urine     Substance abuse ([address] )     Urinary frequency     Urinary tract infection     Visual impairment     Weak urinary stream        Past Surgical History:   Procedure Laterality Date    APPENDECTOMY  2008     SECTION  ,     CYSTOSCOPY W/ RETROGRADES Right 2012    CYSTOSCOPY W/ URETERAL STENT REMOVAL Right 2012    CYSTOSCOPY W/ URETEROSCOPY W/ LITHOTRIPSY Right 2012    DECOMPRESSION FASCIOTOMY LEG Bilateral     FL RETROGRADE PYELOGRAM  2020    AK CYSTO/URETERO W/LITHOTRIPSY &INDWELL STENT INSRT Left 2020    Procedure: CYSTOSCOPY URETEROSCOPY WITH LITHOTRIPSY HOLMIUM LASER, RETROGRADE PYELOGRAM AND INSERTION STENT URETERAL  STONE EXTRACTION;  Surgeon: Arsenio Siddiqui MD;  Location: [facility];  Service: Urology    AK CYSTO/URETERO W/LITHOTRIPSY &INDWELL STENT INSRT Left 2021    Procedure: CYSTOSCOPY URETEROSCOPY, RETROGRADE PYELOGRAM AND INSERTION STENT URETERAL;  Surgeon: Arsenio Siddiqui MD;  Location: [address] [place] [address] MAIN OR;  Service: Urology       Family History   Problem Relation Age of Onset    Cancer Family     Heart attack Family     Nephrolithiasis Family     Nephrolithiasis Family      I have reviewed and agree with the history as documented      E-Cigarette/Vaping    E-Cigarette Use Never User      E-Cigarette/Vaping Substances    Nicotine No     THC No     CBD No     Flavoring No     Other No     Unknown No      Social History     Tobacco Use    Smoking status: Current Every Day Smoker     Packs/day: 1 00    Smokeless tobacco: Never Used   Vaping Use    Vaping Use: Never used   Substance Use Topics    Alcohol use: No    Drug use: No       Review of HYDROmorphone (DILAUDID) injection 0 5 mg (0 5 mg Intravenous Given 9/18/21 1444)   cefTRIAXone (ROCEPHIN) IVPB (premix in dextrose) 1,000 mg 50 mL (0 mg Intravenous Stopped 9/18/21 1532)   iohexol (OMNIPAQUE) 350 MG/ML injection (SINGLE-DOSE) 100 mL (100 mL Intravenous Given 9/18/21 1356)   ketorolac (TORADOL) injection 15 mg (15 mg Intravenous Given 9/18/21 1542)       Diagnostic Studies  Results Reviewed     Procedure Component Value Units Date/Time    Lipase [[id number]]  (Normal) Collected: 09/18/21 1304    Lab Status: Final result Specimen: Blood from Arm, Left Updated: 09/18/21 1337     Lipase 34 u/L     Basic metabolic panel [[id number]]  (Normal) Collected: 09/18/21 1304    Lab Status: Final result Specimen: Blood from Arm, Left Updated: 09/18/21 1337     Sodium 140 mmol/L      Potassium 3 8 mmol/L      Chloride 107 mmol/L      CO2 22 mmol/L      ANION GAP 11 mmol/L      BUN 17 mg/dL      Creatinine 0 91 mg/dL      Glucose 96 mg/dL      Calcium 9 4 mg/dL      eGFR 79 ml/min/1 73sq m     Narrative:      [place] guidelines for Chronic Kidney Disease (CKD):     Stage 1 with normal or high GFR (GFR > 90 mL/min/1 73 square meters)    Stage 2 Mild CKD (GFR = 60-89 mL/min/1 73 square meters)    Stage 3A Moderate CKD (GFR = 45-59 mL/min/1 73 square meters)    Stage 3B Moderate CKD (GFR = 30-44 mL/min/1 73 square meters)    Stage 4 Severe CKD (GFR = 15-29 mL/min/1 73 square meters)    Stage 5 End Stage CKD (GFR <15 mL/min/1 73 square meters)  Note: GFR calculation is accurate only with a steady state creatinine    Lactic acid [[id number]]  (Normal) Collected: 09/18/21 1304    Lab Status: Final result Specimen: Blood from Arm, Left Updated: 09/18/21 1334     LACTIC ACID 1 7 mmol/L     Narrative:      Result may be elevated if tourniquet was used during collection      Urine Microscopic [[id number]]  (Abnormal) Collected: 09/18/21 1305    Lab Status: Final result Specimen: Urine, Clean Catch Updated: 09/18/21 1333     RBC, UA Innumerable /hpf      WBC, UA 10-20 /hpf      Epithelial Cells Occasional /hpf      Bacteria, UA Moderate /hpf     CBC and differential [[id number]]  (Abnormal) Collected: 09/18/21 1304    Lab Status: Final result Specimen: Blood from Arm, Left Updated: 09/18/21 1328     WBC 9 90 Thousand/uL      RBC 4 48 Million/uL      Hemoglobin 13 7 g/dL      Hematocrit 40 8 %      MCV 91 fL      MCH 30 5 pg      MCHC 33 5 g/dL      RDW 12 8 %      MPV 9 7 fL      Platelets 884 Thousands/uL      Neutrophils Relative 80 %      Lymphocytes Relative 13 %      Monocytes Relative 6 %      Eosinophils Relative 1 %      Basophils Relative 0 %      Neutrophils Absolute 7 90 Thousands/µL      Lymphocytes Absolute 1 30 Thousands/µL      Monocytes Absolute 0 50 Thousand/µL      Eosinophils Absolute 0 10 Thousand/µL      Basophils Absolute 0 00 Thousands/µL     UA (URINE) with reflex to Scope [[id number]]  (Abnormal) Collected: 09/18/21 1305    Lab Status: Final result Specimen: Urine, Clean Catch Updated: 09/18/21 1324     Color, UA Lucila     Clarity, UA Bloody     Specific Gravity, UA 1 025     pH, UA 5 0     Leukocytes,  0     Nitrite, UA Positive     Protein,  (2+) mg/dl      Glucose, UA Negative mg/dl      Ketones, UA Negative mg/dl      Bilirubin, UA Negative     Blood,  0     UROBILINOGEN UA 1 0 mg/dL     POCT pregnancy, urine [[id number]]  (Normal) Resulted: 09/18/21 1240    Lab Status: Final result Updated: 09/18/21 1253     EXT PREG TEST UR (Ref: Negative) negative     Control valid                 CT abdomen pelvis with contrast   Final Result by Marlin Yen MD (09/18 6399)   1  Mild left-sided hydroureteronephrosis without obstructing calculus or mass  Additionally there is enhancement and adjacent fat stranding about the left ureter raising suspicion for ascending infection    No evident renal abscess or perinephric    inflammatory stranding to suggest pyelonephritis  2   Multiple nonobstructing left renal calculi  Workstation performed: [id number]                    Procedures  Procedures         ED Course  ED Course as of Sep 18 1648   Sat Sep 18, 2021   1328 Clarity, UA(!): Bloody   1328 Color, UA(!): Lucila   1328 Nitrite, UA(!): Positive   1328 Leukocytes, UA(!): 100 0   1329 PREGNANCY TEST URINE: negative   1337 Bacteria, UA(!): Moderate   1337 RBC, UA(!): Innumerable   1337 Epithelial Cells: Occasional   1337 WBC, UA(!): 10-20   1450 Per urology, can d/c with abx, pain medication, and f/u with urology  [address] system unable to complete e-prescribing of controlled substance  No error message given that can be acted upon to resolve this in a timely matter  Will d/c with paper prescription                                 SBIRT 22yo+      Most Recent Value   SBIRT (24 yo +)   In order to provide better care to our patients, we are screening all of our patients for alcohol and drug use  Would it be okay to ask you these screening questions? No Filed at: 09/18/2021 1253                    MDM  Number of Diagnoses or Management Options  Diagnosis management comments: No pain out of proportion to exam or worsening of pain with or shortly after eating  No peritoneal signs on exam   Patient has no pulsatile abdominal mass, known aneurysm, pulse deficit or asymmetry, tearing or ripping pain  No tenderness at McBurney's point and no positive Dodson sign, or Rovsing sign  Vital signs normal, no fever  Patient is able to pass flatus and stool  Given recent urologic procedure and recent stent removal will do CT AP  SIRS criteria not met at this time           Amount and/or Complexity of Data Reviewed  Clinical lab tests: ordered and reviewed  Tests in the radiology section of CPT®: ordered and reviewed  Tests in the medicine section of CPT®: ordered and reviewed  Review and summarize past medical records: yes    Risk of Complications, Morbidity, and/or Mortality  Presenting problems: moderate  Diagnostic procedures: low    Patient Progress  Patient progress: stable      Disposition  Final diagnoses:   Left flank pain   UTI (urinary tract infection)     Time reflects when diagnosis was documented in both MDM as applicable and the Disposition within this note     Time User Action Codes Description Comment    9/18/2021  2:51 PM Julia Dress [R10 9] Right flank pain     9/18/2021  2:51 PM Larance Bowels Remove [R10 9] Right flank pain     9/18/2021  2:51 PM Larance Bowels Add [R10 9] Left flank pain     9/18/2021  2:51 PM Larance Bowels Add [N39 0] UTI (urinary tract infection)     9/18/2021  3:36 PM Larance Bowels Add [N20 1] Left ureteral stone     9/18/2021  3:38 PM Larance Bowels Modify [N20 1] Left ureteral stone     9/18/2021  3:40 PM Larance Bowels Modify [N20 1] Left ureteral stone     9/18/2021  3:40 PM Larance Bowels Modify [N20 1] Left ureteral stone       ED Disposition     ED Disposition Condition Date/Time Comment    Discharge Stable Sat Sep 18, 2021  2:51 PM [address] discharge to home/self care  Results of completed tests discussed  Return to ER precautions given, verbal and written, and questions answered satisfactorily                  Follow-up Information     Follow up With Specialties Details Why Contact Info Additional Information    Bita Krishnan DO Family Medicine Call in 1 day To recheck symptoms and follow up on your ER visit [facility] [address] [place] [address] Urology Call in 1 day For specialty evaluation and/or treatment [phone] [facility] [phone]  705  [facility] [address] [place] [address], Keisha Lopez [address], Tyrese Edouard [address],  [facility]          Discharge Medication List as of 9/18/2021  2:52 PM      START taking these medications    Details cephalexin (KEFLEX) 250 mg capsule Take 1 capsule (250 mg total) by mouth every 6 (six) hours for 7 days, Starting Sat 9/18/2021, Until Sat 9/25/2021, Normal         CONTINUE these medications which have NOT CHANGED    Details   hydrOXYzine HCL (ATARAX) 25 mg tablet Take 1 tablet (25 mg total) by mouth every 6 (six) hours as needed for itching, Starting Tue 6/8/2021, Normal      medroxyPROGESTERone acetate (DEPO-PROVERA SYRINGE) 150 mg/mL injection Inject 150 mg into a muscle every 3 (three) months , Starting Wed 2/12/2020, Historical Med      Multiple Vitamin (ONE-A-DAY ADULT VITACRAVES+DHA PO) Take by mouth, Historical Med      oxybutynin (DITROPAN) 5 mg tablet Take 1 tablet (5 mg total) by mouth 3 (three) times a day, Starting Fri 9/10/2021, Normal      oxyCODONE (ROXICODONE) 5 mg immediate release tablet Take 1 tablet (5 mg total) by mouth every 6 (six) hours as needed for moderate painMax Daily Amount: 20 mg, Starting Tue 9/14/2021, Normal      !! tamsulosin (FLOMAX) 0 4 mg Take 1 capsule (0 4 mg total) by mouth daily with dinner, Starting Mon 8/30/2021, Normal      !! tamsulosin (FLOMAX) 0 4 mg Take 1 capsule (0 4 mg total) by mouth every evening, Starting Mon 9/13/2021, Normal       !! - Potential duplicate medications found  Please discuss with provider  No discharge procedures on file      PDMP Review       Value Time User    PDMP Reviewed  Yes 9/14/2021  3:11 PM Deniz Christianson MD          ED Provider  Electronically Signed by           Airam Ortega DO  09/18/21 4669

## 2021-09-18 NOTE — DISCHARGE INSTRUCTIONS
You were seen today in the Emergency Department  I have prescribed you antibiotics for your urinary tract infection  Please complete the entire course, even if you start the feel better before completing it  Please follow up with your Primary Care Provider in the next 1-2 days to recheck your symptoms and to follow up on your visit to the Emergency Department today  Please return to the Emergency Department if you have worsening pain fevers, chills, chest pain, shortness of breath, are unable to eat or drink, or have any other symptoms that concern you  Please look this over and let your nurse and/or me know if you have any further questions before you leave

## 2021-09-28 ENCOUNTER — OFFICE VISIT (OUTPATIENT)
Dept: FAMILY MEDICINE CLINIC | Facility: CLINIC | Age: 41
End: 2021-09-28
Payer: COMMERCIAL

## 2021-09-28 DIAGNOSIS — B34.9 ACUTE VIRAL SYNDROME: Primary | ICD-10-CM

## 2021-09-28 PROCEDURE — 99214 OFFICE O/P EST MOD 30 MIN: CPT | Performed by: FAMILY MEDICINE

## 2021-09-28 PROCEDURE — U0003 INFECTIOUS AGENT DETECTION BY NUCLEIC ACID (DNA OR RNA); SEVERE ACUTE RESPIRATORY SYNDROME CORONAVIRUS 2 (SARS-COV-2) (CORONAVIRUS DISEASE [COVID-19]), AMPLIFIED PROBE TECHNIQUE, MAKING USE OF HIGH THROUGHPUT TECHNOLOGIES AS DESCRIBED BY CMS-2020-01-R: HCPCS | Performed by: FAMILY MEDICINE

## 2021-09-28 PROCEDURE — 4004F PT TOBACCO SCREEN RCVD TLK: CPT | Performed by: FAMILY MEDICINE

## 2021-09-28 PROCEDURE — U0005 INFEC AGEN DETEC AMPLI PROBE: HCPCS | Performed by: FAMILY MEDICINE

## 2021-09-28 NOTE — PROGRESS NOTES
Assessment/Plan:  Encourage plenty of fluids rest Tylenol as needed for pain or fever  Will call with swab results  Self quarantine good handwashing technique and hygiene  Diagnoses and all orders for this visit:    Acute viral syndrome            Subjective:        Patient ID: Nabeel Candelaria is a 38 y o  female  Patient presents with:  Earache: Patient having a hard time hearing out of her right ear  Just finished antibiotic yesterday for bladder infection but not helping ear  Cough: Cough from congestion,draining             The following portions of the patient's history were reviewed and updated as appropriate: allergies, current medications, past family history, past medical history, past social history, past surgical history and problem list       Review of Systems   Constitutional: Negative  Negative for fatigue and fever  HENT: Positive for congestion and ear pain  Negative for sore throat  Eyes: Negative  Respiratory: Positive for cough  Cardiovascular: Negative  Gastrointestinal: Negative  Endocrine: Negative  Genitourinary: Negative  Musculoskeletal: Negative  Skin: Negative  Allergic/Immunologic: Negative  Neurological: Negative  Hematological: Negative  Psychiatric/Behavioral: Negative  All other systems reviewed and are negative  Objective: There were no vitals taken for this visit  Physical Exam  Vitals and nursing note reviewed  Constitutional:       Appearance: She is well-developed  HENT:      Head: Normocephalic and atraumatic  Right Ear: External ear normal       Left Ear: External ear normal       Nose: Nose normal    Eyes:      Conjunctiva/sclera: Conjunctivae normal       Pupils: Pupils are equal, round, and reactive to light  Cardiovascular:      Rate and Rhythm: Normal rate and regular rhythm  Heart sounds: Normal heart sounds     Pulmonary:      Effort: Pulmonary effort is normal  Breath sounds: Normal breath sounds  Abdominal:      General: Bowel sounds are normal       Palpations: Abdomen is soft  Musculoskeletal:         General: Normal range of motion  Cervical back: Normal range of motion and neck supple  Skin:     General: Skin is warm and dry  Neurological:      Mental Status: She is alert and oriented to person, place, and time  Deep Tendon Reflexes: Reflexes are normal and symmetric     Psychiatric:         Behavior: Behavior normal

## 2021-09-29 ENCOUNTER — TELEPHONE (OUTPATIENT)
Dept: FAMILY MEDICINE CLINIC | Facility: CLINIC | Age: 41
End: 2021-09-29

## 2021-09-29 LAB — SARS-COV-2 RNA RESP QL NAA+PROBE: NEGATIVE

## 2021-09-29 NOTE — TELEPHONE ENCOUNTER
----- Message from Carol Ann Palmer DO sent at 9/29/2021  1:05 PM EDT -----    Call patient with normal results

## 2021-11-26 NOTE — TELEPHONE ENCOUNTER
Patient called to cancel appointment on 11/30/21  I offered patient appointment on 12/03/21 patient declined due to having to work until 2pm on that day  Next available is in February  Patient advised that she did not want to reschedule at all  I did advise of our policy with seeing AP but patient advised that if she is going to being paying the same amount to see a MD she should be seen by a MD Sophia Valdovinos

## 2021-11-26 NOTE — TELEPHONE ENCOUNTER
RIP for pt to call our office if she would like to see another doctor within our group for her appt  She cx her appt because of work schedule

## 2023-03-02 ENCOUNTER — TELEPHONE (OUTPATIENT)
Dept: UROLOGY | Facility: AMBULATORY SURGERY CENTER | Age: 43
End: 2023-03-02

## 2023-03-02 DIAGNOSIS — R10.9 FLANK PAIN: Primary | ICD-10-CM

## 2023-03-02 NOTE — TELEPHONE ENCOUNTER
Patient of Rachelle Fletcher    Reason for call: started having Kidney stone symptoms on Monday  Has had them many times before  Patient symptoms are: Abdominal pain, back pain, urgency, frequency, no visible blood, bad odor, not cloudy, not sure if she's had a fever      Patient can be reached at [phone]

## 2023-03-02 NOTE — TELEPHONE ENCOUNTER
Called and spoke with patient reports pain is currently 2-3, denies any nausea/vomitng, temp is 99 2  Started on Monday and feels like getting worse  States that it seems to come and go  Reports having low back pain  Reports she feels like sometime she has to push to urinate  Denies blood in urine at this time  Last imaging was in Sept 2021  Did advise increase hydration, monitor for increase in symptoms and ED precautions reviewed  Advised will forward message for possible imaging

## 2023-03-03 NOTE — TELEPHONE ENCOUNTER
I spoke with the patient and provided her with this information  She will call central scheduling to schedule CT scan  Will monitor for results

## 2023-03-07 ENCOUNTER — APPOINTMENT (OUTPATIENT)
Dept: LAB | Age: 43
End: 2023-03-07

## 2023-03-07 DIAGNOSIS — R10.9 FLANK PAIN: ICD-10-CM

## 2023-03-07 LAB
BACTERIA UR QL AUTO: ABNORMAL /HPF
BILIRUB UR QL STRIP: NEGATIVE
CLARITY UR: CLEAR
COLOR UR: ABNORMAL
GLUCOSE UR STRIP-MCNC: NEGATIVE MG/DL
HGB UR QL STRIP.AUTO: NEGATIVE
KETONES UR STRIP-MCNC: NEGATIVE MG/DL
LEUKOCYTE ESTERASE UR QL STRIP: NEGATIVE
MUCOUS THREADS UR QL AUTO: ABNORMAL
NITRITE UR QL STRIP: NEGATIVE
NON-SQ EPI CELLS URNS QL MICRO: ABNORMAL /HPF
PH UR STRIP.AUTO: 6.5 [PH]
PROT UR STRIP-MCNC: NEGATIVE MG/DL
RBC #/AREA URNS AUTO: ABNORMAL /HPF
SP GR UR STRIP.AUTO: 1.02 (ref 1–1.03)
UROBILINOGEN UR STRIP-ACNC: <2 MG/DL
WBC #/AREA URNS AUTO: ABNORMAL /HPF

## 2023-03-07 NOTE — TELEPHONE ENCOUNTER
LVM for pt letting her know she should still have the CT scan done and urine testing since she is still symptomatic

## 2023-03-07 NOTE — TELEPHONE ENCOUNTER
Patient calling back stating she had CT and urine culture ordered, she has passed her kidney stone but is unsure if she should still complete testing  She is still having symptoms, blood in urine, spasms, and frequency is often  Patient is unsure of an infection   Please give a call back @ [phone]

## 2023-03-08 LAB — BACTERIA UR CULT: NORMAL

## 2023-03-09 NOTE — TELEPHONE ENCOUNTER
I spoke with patient and provided her with this information  She is scheduled for a CT scan to have done

## 2023-03-09 NOTE — TELEPHONE ENCOUNTER
Urine culture negative for growth  Bacteria and mucus threads which were seen are nonspecific findings growth of bacteria is going to be definitive on whether there is an infection  Urine culture did not grow anything  Thus, there is no urinary tract infection at this time

## 2023-03-12 ENCOUNTER — HOSPITAL ENCOUNTER (OUTPATIENT)
Dept: CT IMAGING | Facility: HOSPITAL | Age: 43
Discharge: HOME | End: 2023-03-12

## 2023-03-12 DIAGNOSIS — R10.9 FLANK PAIN: ICD-10-CM

## 2023-03-24 ENCOUNTER — OFFICE VISIT (OUTPATIENT)
Dept: UROLOGY | Facility: MEDICAL CENTER | Age: 43
End: 2023-03-24

## 2023-03-24 VITALS
BODY MASS INDEX: 27.32 KG/M2 | DIASTOLIC BLOOD PRESSURE: 60 MMHG | HEIGHT: 65 IN | SYSTOLIC BLOOD PRESSURE: 110 MMHG | OXYGEN SATURATION: 98 % | HEART RATE: 72 BPM | WEIGHT: 164 LBS

## 2023-03-24 DIAGNOSIS — N20.0 CALCULUS OF KIDNEY: Primary | ICD-10-CM

## 2023-03-24 RX ORDER — TAMSULOSIN HYDROCHLORIDE 0.4 MG/1
CAPSULE ORAL
Qty: 30 CAPSULE | Refills: 1 | Status: SHIPPED | OUTPATIENT
Start: 2023-03-24

## 2023-03-24 NOTE — PROGRESS NOTES
3/24/2023      Chief Complaint   Patient presents with   • Calculus of Ureter         Assessment and Plan    36 y o  female managed by Dr Collier Flash     1  Nephrolithiasis   · Recently passed stone, confirmed on CT  · Bilateral nonobstructing stones remain, 2mm on the right, 5 mm on the left  · Reviewed options including observation vs elective intervention  · Patient wishes to continue annual observation  · Reviewed previous stone risk profile results - discussed potassium citrate supplements vs 2-3 oz of lemon in water daily  Patient wishes to try lemon  · Reviewed dietary modifications and importance of daily water intake 40-60 oz  · ED parameters reviewed  · Follow up in 1 year with US  If any stone episodes prior, will order STAT CT for evaluation  History of Present Illness  Amari Craig is a 36 y o  female here for evaluation of nephrolithiasis  Patient has a longstanding history of nephrolithiasis and since she was in high school  She underwent metabolic work-up in 2988 which revealed low daily water intake, elevated calcium oxalate, and low citrate  Plan at that time was to supplement with natural forms of citrate  Her last surgical intervention was in 2021  She recently passed a kidney stone  CT confirmed successful passage  A 2 mm right nonobstructing renal stone and a 5 mm left nonobstructing renal stone remain per CT images  Patient currently denies any flank or abdominal pain  She denies any urinary symptoms  When discussing diet she does note that she drinks little water during the day and does enjoy iced tea  She does not routinely consume other foods high in calcium or oxalate  Vitals  Vitals:    03/24/23 1114   BP: 110/60   BP Location: Left arm   Patient Position: Sitting   Cuff Size: Large   Pulse: 72   SpO2: 98%   Weight: 74 4 kg (164 lb)   Height: 5' 5" (1 651 m)       Physical Exam  Vitals reviewed  Constitutional:       General: She is not in acute distress  Appearance: Normal appearance  She is not ill-appearing, toxic-appearing or diaphoretic  HENT:      Head: Normocephalic and atraumatic  Eyes:      Conjunctiva/sclera: Conjunctivae normal    Pulmonary:      Effort: Pulmonary effort is normal  No respiratory distress  Musculoskeletal:         General: Normal range of motion  Cervical back: Normal range of motion  Skin:     General: Skin is warm and dry  Neurological:      General: No focal deficit present  Mental Status: She is alert and oriented to person, place, and time  Psychiatric:         Mood and Affect: Mood normal          Behavior: Behavior normal          Thought Content:  Thought content normal          Judgment: Judgment normal        Past History  Past Medical History:   Diagnosis Date   • Abdominal pain    • Acute deep vein thrombosis (DVT) of left lower extremity ([facility])    • Depression    • Distorted vision    • Feeling of incomplete bladder emptying    • Kidney stone    • Nocturia    • Otitis media    • Stress incontinence of urine    • Substance abuse ([facility])    • Urinary frequency    • Urinary tract infection    • Visual impairment    • Weak urinary stream      Social History     Socioeconomic History   • Marital status: /Civil Union     Spouse name: [address] [place] [address]   • Number of children: 2   • Years of education: None   • Highest education level: None   Occupational History   • Occupation: Retail     Comment: Part-time   Tobacco Use   • Smoking status: Every Day     Packs/day: 1 00     Types: Cigarettes   • Smokeless tobacco: Never   Vaping Use   • Vaping Use: Never used   Substance and Sexual Activity   • Alcohol use: No   • Drug use: No   • Sexual activity: Yes     Partners: Male     Comment: -PATRICIO JONES   Other Topics Concern   • None   Social History Narrative   • None     Social Determinants of Health     Financial Resource Strain: Not on file   Food Insecurity: Not on file   Transportation Needs: Not on file Physical Activity: Not on file   Stress: Not on file   Social Connections: Not on file   Intimate Partner Violence: Not on file   Housing Stability: Not on file     Social History     Tobacco Use   Smoking Status Every Day   • Packs/day: 1 00   • Types: Cigarettes   Smokeless Tobacco Never     Family History   Problem Relation Age of Onset   • Cancer Family    • Heart attack Family    • Nephrolithiasis Family    • Nephrolithiasis Family        The following portions of the patient's history were reviewed and updated as appropriate: allergies, current medications, past medical history, past social history, past surgical history and problem list     Results  No results found for this or any previous visit (from the past 1 hour(s))  ]  No results found for: PSA  Lab Results   Component Value Date    CALCIUM 9 4 09/18/2021    K 3 8 09/18/2021    CO2 22 09/18/2021     09/18/2021    BUN 17 09/18/2021    CREATININE 0 91 09/18/2021     Lab Results   Component Value Date    WBC 9 90 09/18/2021    HGB 13 7 09/18/2021    HCT 40 8 09/18/2021    MCV 91 09/18/2021     09/18/2021     Alejandra De La Cruz PA-C

## 2023-11-01 ENCOUNTER — OFFICE VISIT (OUTPATIENT)
Dept: FAMILY MEDICINE CLINIC | Facility: CLINIC | Age: 43
End: 2023-11-01
Payer: COMMERCIAL

## 2023-11-01 VITALS
OXYGEN SATURATION: 98 % | WEIGHT: 174 LBS | HEART RATE: 91 BPM | TEMPERATURE: 98.2 F | HEIGHT: 65 IN | DIASTOLIC BLOOD PRESSURE: 50 MMHG | BODY MASS INDEX: 28.99 KG/M2 | SYSTOLIC BLOOD PRESSURE: 106 MMHG

## 2023-11-01 DIAGNOSIS — J01.10 ACUTE NON-RECURRENT FRONTAL SINUSITIS: Primary | ICD-10-CM

## 2023-11-01 DIAGNOSIS — Z11.4 SCREENING FOR HIV (HUMAN IMMUNODEFICIENCY VIRUS): ICD-10-CM

## 2023-11-01 DIAGNOSIS — Z11.59 NEED FOR HEPATITIS C SCREENING TEST: ICD-10-CM

## 2023-11-01 DIAGNOSIS — Z00.00 WELL ADULT EXAM: ICD-10-CM

## 2023-11-01 PROCEDURE — 99214 OFFICE O/P EST MOD 30 MIN: CPT | Performed by: FAMILY MEDICINE

## 2023-11-01 RX ORDER — AMOXICILLIN 500 MG/1
1000 CAPSULE ORAL EVERY 12 HOURS SCHEDULED
Qty: 28 CAPSULE | Refills: 0 | Status: SHIPPED | OUTPATIENT
Start: 2023-11-01 | End: 2023-11-08

## 2023-11-01 NOTE — PROGRESS NOTES
Assessment/Plan:      Diagnoses and all orders for this visit:    Acute non-recurrent frontal sinusitis  -     amoxicillin (AMOXIL) 500 mg capsule; Take 2 capsules (1,000 mg total) by mouth every 12 (twelve) hours for 7 days    Well adult exam  -     Lipid panel  -     Comprehensive metabolic panel  -     Hemoglobin A1C  -     CBC and differential  -     TSH, 3rd generation with Free T4 reflex    Need for hepatitis C screening test  -     Hepatitis C Antibody; Future    Screening for HIV (human immunodeficiency virus)  -     HIV 1/2 AG/AB w Reflex [facility] for 2 yr old and above; Future        I recommend supportive care fluids and rest.  Follow-up if no better in 3 to 5 days. She will have lab work done and return for an annual physical at her convenience. Subjective:     Patient ID: Mattie Mckeon is a 36 y.o. female. Patient presents with:  Earache: Pt c/o earache and cough since this past Sunday. Sapna Knox Physical due. BMI f/u due. Pt has no other concerns. LS        Earache   Associated symptoms include coughing. Pertinent negatives include no sore throat. Review of Systems   Constitutional:  Positive for activity change and appetite change. Negative for fever. HENT:  Positive for ear pain. Negative for sore throat. Respiratory:  Positive for cough. Cardiovascular: Negative. Gastrointestinal: Negative. Objective:     Physical Exam  Vitals and nursing note reviewed. Constitutional:       Appearance: She is well-developed. HENT:      Head: Normocephalic and atraumatic. Nose: Congestion present. Mouth/Throat:      Pharynx: Posterior oropharyngeal erythema present. No oropharyngeal exudate. Eyes:      Pupils: Pupils are equal, round, and reactive to light. Neck:      Vascular: No JVD. Cardiovascular:      Rate and Rhythm: Normal rate. Pulmonary:      Effort: Pulmonary effort is normal.   Abdominal:      Palpations: Abdomen is soft.    Musculoskeletal:      Cervical back: Normal range of motion. Lymphadenopathy:      Cervical: No cervical adenopathy. Neurological:      General: No focal deficit present. Mental Status: She is alert and oriented to person, place, and time.

## 2023-11-27 ENCOUNTER — APPOINTMENT (OUTPATIENT)
Dept: LAB | Age: 43
End: 2023-11-27
Payer: COMMERCIAL

## 2023-11-27 DIAGNOSIS — Z11.4 SCREENING FOR HIV (HUMAN IMMUNODEFICIENCY VIRUS): ICD-10-CM

## 2023-11-27 DIAGNOSIS — Z11.59 NEED FOR HEPATITIS C SCREENING TEST: ICD-10-CM

## 2023-11-27 LAB
ALBUMIN SERPL BCP-MCNC: 4.5 G/DL (ref 3.5–5)
ALP SERPL-CCNC: 73 U/L (ref 34–104)
ALT SERPL W P-5'-P-CCNC: 35 U/L (ref 7–52)
ANION GAP SERPL CALCULATED.3IONS-SCNC: 10 MMOL/L
AST SERPL W P-5'-P-CCNC: 20 U/L (ref 13–39)
BASOPHILS # BLD AUTO: 0.04 THOUSANDS/ÂΜL (ref 0–0.1)
BASOPHILS NFR BLD AUTO: 1 % (ref 0–1)
BILIRUB SERPL-MCNC: 0.36 MG/DL (ref 0.2–1)
BUN SERPL-MCNC: 16 MG/DL (ref 5–25)
CALCIUM SERPL-MCNC: 9.2 MG/DL (ref 8.4–10.2)
CHLORIDE SERPL-SCNC: 107 MMOL/L (ref 96–108)
CHOLEST SERPL-MCNC: 222 MG/DL
CO2 SERPL-SCNC: 22 MMOL/L (ref 21–32)
CREAT SERPL-MCNC: 0.7 MG/DL (ref 0.6–1.3)
EOSINOPHIL # BLD AUTO: 0.1 THOUSAND/ÂΜL (ref 0–0.61)
EOSINOPHIL NFR BLD AUTO: 2 % (ref 0–6)
ERYTHROCYTE [DISTWIDTH] IN BLOOD BY AUTOMATED COUNT: 12.3 % (ref 11.6–15.1)
EST. AVERAGE GLUCOSE BLD GHB EST-MCNC: 114 MG/DL
GFR SERPL CREATININE-BSD FRML MDRD: 105 ML/MIN/1.73SQ M
GLUCOSE P FAST SERPL-MCNC: 87 MG/DL (ref 65–99)
HBA1C MFR BLD: 5.6 %
HCT VFR BLD AUTO: 41.8 % (ref 34.8–46.1)
HDLC SERPL-MCNC: 38 MG/DL
HGB BLD-MCNC: 14.1 G/DL (ref 11.5–15.4)
HIV 1+2 AB+HIV1 P24 AG SERPL QL IA: NORMAL
HIV 2 AB SERPL QL IA: NORMAL
HIV1 AB SERPL QL IA: NORMAL
HIV1 P24 AG SERPL QL IA: NORMAL
IMM GRANULOCYTES # BLD AUTO: 0.04 THOUSAND/UL (ref 0–0.2)
IMM GRANULOCYTES NFR BLD AUTO: 1 % (ref 0–2)
LDLC SERPL CALC-MCNC: 161 MG/DL (ref 0–100)
LYMPHOCYTES # BLD AUTO: 2.12 THOUSANDS/ÂΜL (ref 0.6–4.47)
LYMPHOCYTES NFR BLD AUTO: 33 % (ref 14–44)
MCH RBC QN AUTO: 30.7 PG (ref 26.8–34.3)
MCHC RBC AUTO-ENTMCNC: 33.7 G/DL (ref 31.4–37.4)
MCV RBC AUTO: 91 FL (ref 82–98)
MONOCYTES # BLD AUTO: 0.48 THOUSAND/ÂΜL (ref 0.17–1.22)
MONOCYTES NFR BLD AUTO: 8 % (ref 4–12)
NEUTROPHILS # BLD AUTO: 3.64 THOUSANDS/ÂΜL (ref 1.85–7.62)
NEUTS SEG NFR BLD AUTO: 55 % (ref 43–75)
NONHDLC SERPL-MCNC: 184 MG/DL
NRBC BLD AUTO-RTO: 0 /100 WBCS
PLATELET # BLD AUTO: 256 THOUSANDS/UL (ref 149–390)
PMV BLD AUTO: 11.2 FL (ref 8.9–12.7)
POTASSIUM SERPL-SCNC: 4.2 MMOL/L (ref 3.5–5.3)
PROT SERPL-MCNC: 6.6 G/DL (ref 6.4–8.4)
RBC # BLD AUTO: 4.6 MILLION/UL (ref 3.81–5.12)
SODIUM SERPL-SCNC: 139 MMOL/L (ref 135–147)
TRIGL SERPL-MCNC: 116 MG/DL
TSH SERPL DL<=0.05 MIU/L-ACNC: 1.32 UIU/ML (ref 0.45–4.5)
WBC # BLD AUTO: 6.42 THOUSAND/UL (ref 4.31–10.16)

## 2023-11-27 PROCEDURE — 36415 COLL VENOUS BLD VENIPUNCTURE: CPT

## 2023-11-27 PROCEDURE — 87389 HIV-1 AG W/HIV-1&-2 AB AG IA: CPT

## 2023-11-27 PROCEDURE — 86803 HEPATITIS C AB TEST: CPT

## 2023-11-28 LAB — HCV AB SER QL: NORMAL

## 2023-12-05 ENCOUNTER — OFFICE VISIT (OUTPATIENT)
Dept: FAMILY MEDICINE CLINIC | Facility: CLINIC | Age: 43
End: 2023-12-05
Payer: COMMERCIAL

## 2023-12-05 VITALS
TEMPERATURE: 97.7 F | WEIGHT: 169.8 LBS | OXYGEN SATURATION: 99 % | DIASTOLIC BLOOD PRESSURE: 60 MMHG | BODY MASS INDEX: 28.29 KG/M2 | SYSTOLIC BLOOD PRESSURE: 110 MMHG | HEIGHT: 65 IN | HEART RATE: 93 BPM

## 2023-12-05 DIAGNOSIS — G47.33 OBSTRUCTIVE SLEEP APNEA SYNDROME: ICD-10-CM

## 2023-12-05 DIAGNOSIS — F43.10 PTSD (POST-TRAUMATIC STRESS DISORDER): ICD-10-CM

## 2023-12-05 DIAGNOSIS — Z00.00 ANNUAL PHYSICAL EXAM: Primary | ICD-10-CM

## 2023-12-05 DIAGNOSIS — F41.9 ANXIETY: ICD-10-CM

## 2023-12-05 PROCEDURE — 99396 PREV VISIT EST AGE 40-64: CPT | Performed by: FAMILY MEDICINE

## 2023-12-05 RX ORDER — HYDROXYZINE HYDROCHLORIDE 25 MG/1
25 TABLET, FILM COATED ORAL EVERY 6 HOURS PRN
Qty: 60 TABLET | Refills: 3 | Status: SHIPPED | OUTPATIENT
Start: 2023-12-05

## 2023-12-05 NOTE — PROGRESS NOTES
[place]    NAME: Sebastian Oswald  AGE: 39 y.o. SEX: female  : 1980     DATE: 2023     Assessment and Plan:     Problem List Items Addressed This Visit     Anxiety    Relevant Medications    hydrOXYzine HCL (ATARAX) 25 mg tablet    PTSD (post-traumatic stress disorder)    Relevant Medications    hydrOXYzine HCL (ATARAX) 25 mg tablet   Other Visit Diagnoses     Annual physical exam    -  Primary    BMI 28.0-28.9,adult        Obstructive sleep apnea syndrome        Relevant Orders    Ambulatory Referral to Sleep Medicine          Immunizations and preventive care screenings were discussed with patient today. Appropriate education was printed on patient's after visit summary. Counseling:  Alcohol/drug use: discussed moderation in alcohol intake, the recommendations for healthy alcohol use, and avoidance of illicit drug use. Dental Health: discussed importance of regular tooth brushing, flossing, and dental visits. Injury prevention: discussed safety/seat belts, safety helmets, smoke detectors, carbon dioxide detectors, and smoking near bedding or upholstery. Sexual health: discussed sexually transmitted diseases, partner selection, use of condoms, avoidance of unintended pregnancy, and contraceptive alternatives. Exercise: the importance of regular exercise/physical activity was discussed. Recommend exercise 3-5 times per week for at least 30 minutes. BMI Counseling: Body mass index is 28.26 kg/m². The BMI is above normal. Nutrition recommendations include decreasing portion sizes, encouraging healthy choices of fruits and vegetables, decreasing fast food intake, consuming healthier snacks, limiting drinks that contain sugar, moderation in carbohydrate intake, increasing intake of lean protein, reducing intake of saturated and trans fat and reducing intake of cholesterol.  Exercise recommendations include moderate physical activity 150 minutes/week. No pharmacotherapy was ordered. Rationale for BMI follow-up plan is due to patient being overweight or obese. Return in 1 year (on 12/5/2024). Chief Complaint:     Chief Complaint   Patient presents with   • Well Check     Physical visit. BMI f/u due. Pt has no concerns. LS      History of Present Illness:     Adult Annual Physical   Patient here for a comprehensive physical exam. The patient reports no problems. Diet and Physical Activity  Diet/Nutrition: well balanced diet. Exercise: moderate cardiovascular exercise. Depression Screening  PHQ-2/9 Depression Screening         General Health  Sleep: experiences daytime hypersomnolence, unrefreshing sleep, and witnessed gasping. Hearing: normal - bilateral.  Vision: no vision problems. Dental: regular dental visits. /GYN Health  Follows with gynecology? yes   Patient is: perimenopausal  Last menstrual period: na  Contraceptive method: injectable contraception. Advanced Care Planning  Do you have an advanced directive? no  Do you have a durable medical power of ? no     Review of Systems:     Review of Systems   Constitutional: Negative. HENT: Negative. Eyes: Negative. Respiratory: Negative. Cardiovascular: Negative. Gastrointestinal: Negative. Endocrine: Negative. Genitourinary: Negative. Musculoskeletal: Negative. Skin: Negative. Allergic/Immunologic: Negative. Neurological: Negative. Hematological: Negative. Psychiatric/Behavioral: Negative. All other systems reviewed and are negative.      Past Medical History:     Past Medical History:   Diagnosis Date   • Abdominal pain    • Acute deep vein thrombosis (DVT) of left lower extremity ([facility])    • Depression    • Distorted vision    • Feeling of incomplete bladder emptying    • Kidney stone    • Nocturia    • Otitis media    • Stress incontinence of urine    • Substance abuse [facility])    • Urinary frequency    • Urinary tract infection    • Visual impairment    • Weak urinary stream       Past Surgical History:     Past Surgical History:   Procedure Laterality Date   • APPENDECTOMY     •  SECTION  ,    • CYSTOSCOPY W/ RETROGRADES Right    • CYSTOSCOPY W/ URETERAL STENT REMOVAL Right    • CYSTOSCOPY W/ URETEROSCOPY W/ LITHOTRIPSY Right    • DECOMPRESSION FASCIOTOMY LEG Bilateral    • FL RETROGRADE PYELOGRAM  2020   • FL RETROGRADE PYELOGRAM  2021   • HI CYSTO/URETERO W/LITHOTRIPSY &INDWELL STENT INSRT Left 2020    Procedure: CYSTOSCOPY URETEROSCOPY WITH LITHOTRIPSY HOLMIUM LASER, RETROGRADE PYELOGRAM AND INSERTION STENT URETERAL  STONE EXTRACTION;  Surgeon: Peggy Oliver MD;  Location: [facility];  Service: Urology   • HI CYSTO/URETERO W/LITHOTRIPSY &INDWELL STENT INSRT Left 2021    Procedure: CYSTOSCOPY URETEROSCOPY, RETROGRADE PYELOGRAM AND INSERTION STENT URETERAL;  Surgeon: Peggy Oliver MD;  Location: [address] [place] [address] MAIN OR;  Service: Urology      Social History:     Social History     Socioeconomic History   • Marital status: /Civil Union     Spouse name: Bren Andrade   • Number of children: 2   • Years of education: None   • Highest education level: None   Occupational History   • Occupation: Retail     Comment: Part-time   Tobacco Use   • Smoking status: Every Day     Packs/day: 1.00     Types: Cigarettes   • Smokeless tobacco: Never   Vaping Use   • Vaping Use: Never used   Substance and Sexual Activity   • Alcohol use: No   • Drug use: No   • Sexual activity: Yes     Partners: Male     Comment: -PATRICIO JONES   Other Topics Concern   • None   Social History Narrative   • None     Social Determinants of Health     Financial Resource Strain: Low Risk  (2021)    Overall Financial Resource Strain (CARDIA)    • Difficulty of Paying Living Expenses: Not hard at all   Food Insecurity: No Food Insecurity (2021)    Hunger Vital Sign    • Worried About Running Out of Food in the Last Year: Never true    • Ran Out of Food in the Last Year: Never true   Transportation Needs: No Transportation Needs (6/8/2021)    PRAPARE - Transportation    • Lack of Transportation (Medical): No    • Lack of Transportation (Non-Medical): No   Physical Activity: Insufficiently Active (6/8/2021)    Exercise Vital Sign    • Days of Exercise per Week: 3 days    • Minutes of Exercise per Session: 30 min   Stress: No Stress Concern Present (6/8/2021)    [facility]    • Feeling of Stress :  Only a little   Social Connections: Unknown (6/8/2021)    Social Connection and Isolation Panel [NHANES]    • Frequency of Communication with Friends and Family: More than three times a week    • Frequency of Social Gatherings with Friends and Family: More than three times a week    • Attends Tenri[employer] Services: Not on file    • Active Member of Clubs or Organizations: No    • Attends Club or Organization Meetings: Never    • Marital Status:    Intimate Partner Violence: Not At Risk (6/8/2021)    Humiliation, Afraid, Rape, and Kick questionnaire    • Fear of Current or Ex-Partner: No    • Emotionally Abused: No    • Physically Abused: No    • Sexually Abused: No   Housing Stability: Not on file      Family History:     Family History   Problem Relation Age of Onset   • Cancer Family    • Heart attack Family    • Nephrolithiasis Family    • Nephrolithiasis Family       Current Medications:     Current Outpatient Medications   Medication Sig Dispense Refill   • hydrOXYzine HCL (ATARAX) 25 mg tablet Take 1 tablet (25 mg total) by mouth every 6 (six) hours as needed for itching 60 tablet 3   • medroxyPROGESTERone acetate (DEPO-PROVERA SYRINGE) 150 mg/mL injection Inject 150 mg into a muscle every 3 (three) months      • Multiple Vitamin (ONE-A-DAY ADULT VITACRAVES+DHA PO) Take by mouth     • tamsulosin (FLOMAX) 0.4 mg Take 1 tablet once daily prn while passing kidney stones 30 capsule 1     No current facility-administered medications for this visit. Allergies: Allergies   Allergen Reactions   • Hydrocodone-Acetaminophen Hives     hives (tolerates tylenol w/ codeine)      Physical Exam:     /60 (BP Location: Left arm, Patient Position: Sitting, Cuff Size: Large)   Pulse 93   Temp 97.7 °F (36.5 °C) (Tympanic)   Ht 5' 5" (1.651 m)   Wt 77 kg (169 lb 12.8 oz)   SpO2 99%   BMI 28.26 kg/m²     Physical Exam  Vitals and nursing note reviewed. Constitutional:       Appearance: She is well-developed. HENT:      Head: Normocephalic. Eyes:      Pupils: Pupils are equal, round, and reactive to light. Cardiovascular:      Rate and Rhythm: Normal rate and regular rhythm. Heart sounds: Normal heart sounds. Pulmonary:      Effort: Pulmonary effort is normal.      Breath sounds: Normal breath sounds. Abdominal:      General: Bowel sounds are normal.      Palpations: Abdomen is soft. Musculoskeletal:         General: Normal range of motion. Cervical back: Normal range of motion and neck supple. Skin:     General: Skin is warm and dry. Capillary Refill: Capillary refill takes less than 2 seconds. Neurological:      General: No focal deficit present. Mental Status: She is alert and oriented to person, place, and time.    Psychiatric:         Mood and Affect: Mood normal.         Behavior: Behavior normal.          Domo Espinosa DO  [address]

## 2023-12-05 NOTE — PATIENT INSTRUCTIONS
Wellness Visit for Adults   AMBULATORY CARE:   A wellness visit  is when you see your healthcare provider to get screened for health problems. Your healthcare provider will also give you advice on how to stay healthy. Write down your questions so you remember to ask them. Ask your healthcare provider how often you should have a wellness visit. What happens at a wellness visit:  Your healthcare provider will ask about your health, and your family history of health problems. This includes high blood pressure, heart disease, and cancer. He or she will ask if you have symptoms that concern you, if you smoke, and about your mood. You may also be asked about your intake of medicines, supplements, food, and alcohol. Any of the following may be done: Your weight  will be checked. Your height may also be checked so your body mass index (BMI) can be calculated. Your BMI shows if you are at a healthy weight. Your blood pressure  and heart rate will be checked. Your temperature may also be checked. Blood and urine tests  may be done. Blood tests may be done to check your cholesterol levels. Abnormal cholesterol levels increase your risk for heart disease and stroke. You may also need a blood or urine test to check for diabetes if you are at increased risk. Urine tests may be done to look for signs of an infection or kidney disease. A physical exam  includes checking your heartbeat and lungs with a stethoscope. Your healthcare provider may also check your skin to look for sun damage. Screening tests  may be recommended. A screening test is done to check for diseases that may not cause symptoms. The screening tests you may need depend on your age, gender, family history, and lifestyle habits. For example, colorectal screening may be recommended if you are 48years old or older. Screening tests you need if you are a woman:   A Pap smear  is used to screen for cervical cancer.  Pap smears are usually done every 3 to 5 years depending on your age. You may need them more often if you have had abnormal Pap smear test results in the past. Ask your healthcare provider how often you should have a Pap smear. A mammogram  is an x-ray of your breasts to screen for breast cancer. Experts recommend mammograms every 2 years starting at age 48 years. You may need a mammogram at age 52 years or younger if you have an increased risk for breast cancer. Talk to your healthcare provider about when you should start having mammograms and how often you need them. Vaccines you may need:   Get an influenza vaccine  every year. The influenza vaccine protects you from the flu. Several types of viruses cause the flu. The viruses change over time, so new vaccines are made each year. Get a tetanus-diphtheria (Td) booster vaccine  every 10 years. This vaccine protects you against tetanus and diphtheria. Tetanus is a severe infection that may cause painful muscle spasms and lockjaw. Diphtheria is a severe bacterial infection that causes a thick covering in the back of your mouth and throat. Get a human papillomavirus (HPV) vaccine  if you are female and aged 23 to 32 or male 23 to 24 and never received it. This vaccine protects you from HPV infection. HPV is the most common infection spread by sexual contact. HPV may also cause vaginal, penile, and anal cancers. Get a pneumococcal vaccine  if you are aged 72 years or older. The pneumococcal vaccine is an injection given to protect you from pneumococcal disease. Pneumococcal disease is an infection caused by pneumococcal bacteria. The infection may cause pneumonia, meningitis, or an ear infection. Get a shingles vaccine  if you are 60 or older, even if you have had shingles before. The shingles vaccine is an injection to protect you from the varicella-zoster virus. This is the same virus that causes chickenpox.  Shingles is a painful rash that develops in people who had chickenpox or have been exposed to the virus. How to eat healthy:  My Plate is a model for planning healthy meals. It shows the types and amounts of foods that should go on your plate. Fruits and vegetables make up about half of your plate, and grains and protein make up the other half. A serving of dairy is included on the side of your plate. The amount of calories and serving sizes you need depends on your age, gender, weight, and height. Examples of healthy foods are listed below:  Eat a variety of vegetables  such as dark green, red, and orange vegetables. You can also include canned vegetables low in sodium (salt) and frozen vegetables without added butter or sauces. Eat a variety of fresh fruits , canned fruit in 100% juice, frozen fruit, and dried fruit. Include whole grains. At least half of the grains you eat should be whole grains. Examples include whole-wheat bread, wheat pasta, brown rice, and whole-grain cereals such as oatmeal.    Eat a variety of protein foods such as seafood (fish and shellfish), lean meat, and poultry without skin (turkey and chicken). Examples of lean meats include pork leg, shoulder, or tenderloin, and beef round, sirloin, tenderloin, and extra lean ground beef. Other protein foods include eggs and egg substitutes, beans, peas, soy products, nuts, and seeds. Choose low-fat dairy products such as skim or 1% milk or low-fat yogurt, cheese, and cottage cheese. Limit unhealthy fats  such as butter, hard margarine, and shortening. Exercise:  Exercise at least 30 minutes per day on most days of the week. Some examples of exercise include walking, biking, dancing, and swimming. You can also fit in more physical activity by taking the stairs instead of the elevator or parking farther away from stores. Include muscle strengthening activities 2 days each week. Regular exercise provides many health benefits.  It helps you manage your weight, and decreases your risk for type 2 diabetes, heart disease, stroke, and high blood pressure. Exercise can also help improve your mood. Ask your healthcare provider about the best exercise plan for you. General health and safety guidelines:   Do not smoke. Nicotine and other chemicals in cigarettes and cigars can cause lung damage. Ask your healthcare provider for information if you currently smoke and need help to quit. E-cigarettes or smokeless tobacco still contain nicotine. Talk to your healthcare provider before you use these products. Limit alcohol. A drink of alcohol is 12 ounces of beer, 5 ounces of wine, or 1½ ounces of liquor. Lose weight, if needed. Being overweight increases your risk of certain health conditions. These include heart disease, high blood pressure, type 2 diabetes, and certain types of cancer. Protect your skin. Do not sunbathe or use tanning beds. Use sunscreen with a SPF 15 or higher. Apply sunscreen at least 15 minutes before you go outside. Reapply sunscreen every 2 hours. Wear protective clothing, hats, and sunglasses when you are outside. Drive safely. Always wear your seatbelt. Make sure everyone in your car wears a seatbelt. A seatbelt can save your life if you are in an accident. Do not use your cell phone when you are driving. This could distract you and cause an accident. Pull over if you need to make a call or send a text message. Practice safe sex. Use latex condoms if are sexually active and have more than one partner. Your healthcare provider may recommend screening tests for sexually transmitted infections (STIs). Wear helmets, lifejackets, and protective gear. Always wear a helmet when you ride a bike or motorcycle, go skiing, or play sports that could cause a head injury. Wear protective equipment when you play sports. Wear a lifejacket when you are on a boat or doing water sports.     © Copyright [facility] 2023 Information is for End User's use only and may not be sold, redistributed or otherwise used for commercial purposes. The above information is an  only. It is not intended as medical advice for individual conditions or treatments. Talk to your doctor, nurse or pharmacist before following any medical regimen to see if it is safe and effective for you. Weight Management   AMBULATORY CARE:   Why it is important to manage your weight:  Being overweight increases your risk of health conditions such as heart disease, high blood pressure, type 2 diabetes, and certain types of cancer. It can also increase your risk for osteoarthritis, sleep apnea, and other respiratory problems. Aim for a slow, steady weight loss. Even a small amount of weight loss can lower your risk of health problems. Risks of being overweight:  Extra weight can cause many health problems, including the following:  Diabetes (high blood sugar level)    High blood pressure or high cholesterol    Heart disease    Stroke    Gallbladder or liver disease    Cancer of the colon, breast, prostate, liver, or kidney    Sleep apnea    Arthritis or gout    Screening  is done to check for health conditions before you have signs or symptoms. If you are 28to 79years old, your blood sugar level may be checked every 3 years for signs of prediabetes or diabetes. Your healthcare provider will check your blood pressure at each visit. High blood pressure can lead to a stroke or other problems. Your provider may check for signs of heart disease, cancer, or other health problems. How to lose weight safely:  A safe and healthy way to lose weight is to eat fewer calories and get regular exercise. You can lose up about 1 pound a week by decreasing the number of calories you eat by 500 calories each day. You can decrease calories by eating smaller portion sizes or by cutting out high-calorie foods. Read labels to find out how many calories are in the foods you eat.          You can also burn calories with exercise such as walking, swimming, or biking. You will be more likely to keep weight off if you make these changes part of your lifestyle. Exercise at least 30 minutes per day on most days of the week. You can also fit in more physical activity by taking the stairs instead of the elevator or parking farther away from stores. Ask your healthcare provider about the best exercise plan for you. Healthy meal plan for weight management:  A healthy meal plan includes a variety of foods, contains fewer calories, and helps you stay healthy. A healthy meal plan includes the following:     Eat whole-grain foods more often. A healthy meal plan should contain fiber. Fiber is the part of grains, fruits, and vegetables that is not broken down by your body. Whole-grain foods are healthy and provide extra fiber in your diet. Some examples of whole-grain foods are whole-wheat breads and pastas, oatmeal, brown rice, and bulgur. Eat a variety of vegetables every day. Include dark, leafy greens such as spinach, kale, vianey greens, and mustard greens. Eat yellow and orange vegetables such as carrots, sweet potatoes, and winter squash. Eat a variety of fruits every day. Choose fresh or canned fruit (canned in its own juice or light syrup) instead of juice. Fruit juice has very little or no fiber. Eat low-fat dairy foods. Drink fat-free (skim) milk or 1% milk. Eat fat-free yogurt and low-fat cottage cheese. Try low-fat cheeses such as mozzarella and other reduced-fat cheeses. Choose meat and other protein foods that are low in fat. Choose beans or other legumes such as split peas or lentils. Choose fish, skinless poultry (chicken or turkey), or lean cuts of red meat (beef or pork). Before you cook meat or poultry, cut off any visible fat. Use less fat and oil. Try baking foods instead of frying them. Add less fat, such as margarine, sour cream, regular salad dressing and mayonnaise to foods. Eat fewer high-fat foods.  Some examples of high-fat foods include french fries, doughnuts, ice cream, and cakes. Eat fewer sweets. Limit foods and drinks that are high in sugar. This includes candy, cookies, regular soda, and sweetened drinks. Ways to decrease calories:   Eat smaller portions. Use a small plate with smaller servings. Do not eat second helpings. When you eat at a restaurant, ask for a box and place half of your meal in the box before you eat. Share an entrée with someone else. Replace high-calorie snacks with healthy, low-calorie snacks. Choose fresh fruit, vegetables, fat-free rice cakes, or air-popped popcorn instead of potato chips, nuts, or chocolate. Choose water or calorie-free drinks instead of soda or sweetened drinks. Do not shop for groceries when you are hungry. You may be more likely to make unhealthy food choices. Take a grocery list of healthy foods and shop after you have eaten. Eat regular meals. Do not skip meals. Skipping meals can lead to overeating later in the day. This can make it harder for you to lose weight. Eat a healthy snack in place of a meal if you do not have time to eat a regular meal. Talk with a dietitian to help you create a meal plan and schedule that is right for you. Other things to consider as you try to lose weight:   Be aware of situations that may give you the urge to overeat, such as eating while watching television. Find ways to avoid these situations. For example, read a book, go for a walk, or do crafts. Meet with a weight loss support group or friends who are also trying to lose weight. This may help you stay motivated to continue working on your weight loss goals. © Copyright [employer] 2023 Information is for End User's use only and may not be sold, redistributed or otherwise used for commercial purposes. The above information is an  only. It is not intended as medical advice for individual conditions or treatments.  Talk to your doctor, nurse or pharmacist before following any medical regimen to see if it is safe and effective for you. Cholesterol and Your Health   AMBULATORY CARE:   Cholesterol  is a waxy, fat-like substance. Your body uses cholesterol to make hormones and new cells, and to protect nerves. Cholesterol is made by your body. It also comes from certain foods you eat, such as meat and dairy products. Your healthcare provider can help you set goals for your cholesterol levels. Your provider can help you create a plan to meet your goals. Cholesterol level goals: Your cholesterol level goals depend on your risk for heart disease, your age, and your other health conditions. The following are general guidelines: Total cholesterol  includes low-density lipoprotein (LDL), high-density lipoprotein (HDL), and triglyceride levels. The total cholesterol level should be lower than 200 mg/dL and is best at about 150 mg/dL. LDL cholesterol  is called bad cholesterol  because it forms plaque in your arteries. As plaque builds up, your arteries become narrow, and less blood flows through. When plaque decreases blood flow to your heart, you may have chest pain. If plaque completely blocks an artery that brings blood to your heart, you may have a heart attack. Plaque can break off and form blood clots. Blood clots may block arteries in your brain and cause a stroke. The level should be less than 130 mg/dL and is best at about 100 mg/dL. HDL cholesterol  is called good cholesterol  because it helps remove LDL cholesterol from your arteries. It does this by attaching to LDL cholesterol and carrying it to your liver. Your liver breaks down LDL cholesterol so your body can get rid of it. High levels of HDL cholesterol can help prevent a heart attack and stroke. Low levels of HDL cholesterol can increase your risk for heart disease, heart attack, and stroke.  The level should be at least 40 mg/dL in males or at least 50 mg/dL in females. Triglycerides  are a type of fat that store energy from foods you eat. High levels of triglycerides also cause plaque buildup. This can increase your risk for a heart attack or stroke. If your triglyceride level is high, your LDL cholesterol level may also be high. The level should be less than 150 mg/dL. Any of the following can increase your risk for high cholesterol:   Smoking or drinking large amounts of alcohol    Having overweight or obesity, or not getting enough exercise    A medical condition such as hypertension (high blood pressure) or diabetes    A family history of high cholesterol    Age older than 72    What you need to know about having your cholesterol levels checked: Adults 21to 39years of age should have their cholesterol levels checked every 4 to 6 years. Adults 45 years or older should have their cholesterol checked every 1 to 2 years. You may need your cholesterol checked more often, or at a younger age, if you have risk factors for heart disease. You may also need to have your cholesterol checked more often if you have other health conditions, such as diabetes. Blood tests are used to check cholesterol levels. Blood tests measure your levels of triglycerides, LDL cholesterol, and HDL cholesterol. How healthy fats affect your cholesterol levels:  Healthy fats, also called unsaturated fats, help lower LDL cholesterol and triglyceride levels. Healthy fats include the following:  Monounsaturated fats  are found in foods such as olive oil, canola oil, avocado, nuts, and olives. Polyunsaturated fats,  such as omega 3 fats, are found in fish, such as salmon, trout, and tuna. They can also be found in plant foods such as flaxseed, walnuts, and soybeans. How unhealthy fats affect your cholesterol levels:  Unhealthy fats increase LDL cholesterol and triglyceride levels.  They are found in foods high in cholesterol, saturated fat, and trans fat:  Cholesterol  is found in eggs, dairy, and meat. Saturated fat  is found in butter, cheese, ice cream, whole milk, and coconut oil. Saturated fat is also found in meat, such as sausage, hot dogs, and bologna. Trans fat  is found in liquid oils and is used in fried and baked foods. Foods that contain trans fats include chips, crackers, muffins, sweet rolls, microwave popcorn, and cookies. Treatment  for high cholesterol will also decrease your risk of heart disease, heart attack, and stroke. Treatment may include any of the following:  Lifestyle changes  may include food, exercise, weight loss, and quitting smoking. You may also need to decrease the amount of alcohol you drink. Your healthcare provider will want you to start with lifestyle changes. Other treatment may be added if lifestyle changes are not enough. Your healthcare provider may recommend you work with a team to manage hyperlipidemia. The team may include medical experts such as a dietitian, an exercise or physical therapist, and a behavior therapist. Your family members may be included in helping you create lifestyle changes. Medicines  may be given to lower your LDL cholesterol, triglyceride levels, or total cholesterol level. You may need medicines to lower your cholesterol if any of the following is true:    You have a history of stroke, TIA, unstable angina, or a heart attack. Your LDL cholesterol level is 190 mg/dL or higher. You are age 36 to 76 years, have diabetes or heart disease risk factors, and your LDL cholesterol is 70 mg/dL or higher. Supplements  include fish oil, red yeast rice, and garlic. Fish oil may help lower your triglyceride and LDL cholesterol levels. It may also increase your HDL cholesterol level. Red yeast rice may help decrease your total cholesterol level and LDL cholesterol level. Garlic may help lower your total cholesterol level. Do not take any supplements without talking to your healthcare provider.     Food changes you can make to lower your cholesterol levels:  A dietitian can help you create a healthy eating plan. Your dietitian can show you how to read food labels and choose foods low in saturated fat, trans fats, and cholesterol. Decrease the total amount of fat you eat. Choose lean meats, fat-free or 1% fat milk, and low-fat dairy products, such as yogurt and cheese. Try to limit or avoid red meats. Limit or do not eat fried foods or baked goods, such as cookies. Replace unhealthy fats with healthy fats. Cook foods in olive oil or canola oil. Choose soft margarines that are low in saturated fat and trans fat. Seeds, nuts, and avocados are other examples of healthy fats. Eat foods with omega-3 fats. Examples include salmon, tuna, mackerel, walnuts, and flaxseed. Eat fish 2 times per week. Pregnant women should not eat fish that have high levels of mercury, such as shark, swordfish, and george mackerel. Increase the amount of high-fiber foods you eat. High-fiber foods can help lower your LDL cholesterol. Aim to get between 20 and 30 grams of fiber each day. Fruits and vegetables are high in fiber. Eat at least 5 servings each day. Other high-fiber foods are whole-grain or whole-wheat breads, pastas, or cereals, and brown rice. Eat 3 ounces of whole-grain foods each day. Increase fiber slowly. You may have abdominal discomfort, bloating, and gas if you add fiber to your diet too quickly. Eat healthy protein foods. Examples include low-fat dairy products, skinless chicken and turkey, fish, and nuts. Limit foods and drinks that are high in sugar. Your dietitian or healthcare provider can help you create daily limits for high-sugar foods and drinks. The limit may be lower if you have diabetes or another health condition. Limits can also help you lose weight if needed. Lifestyle changes you can make to lower your cholesterol levels:   Maintain a healthy weight.   Ask your healthcare provider what a healthy weight is for you. Ask your provider to help you create a weight loss plan if needed. Weight loss can decrease your total cholesterol and triglyceride levels. Weight loss may also help keep your blood pressure at a healthy level. Be physically active throughout the day. Physical activity, such as exercise, can help lower your total cholesterol level and maintain a healthy weight. Physical activity can also help increase your HDL cholesterol level. Work with your healthcare provider to create an program that is right for you. Get at least 30 to 40 minutes of moderate physical activity most days of the week. Examples of exercise include brisk walking, swimming, or biking. Also include strength training at least 2 times each week. Your healthcare providers can help you create a physical activity plan. Do not smoke. Nicotine and other chemicals in cigarettes and cigars can raise your cholesterol levels. Ask your healthcare provider for information if you currently smoke and need help to quit. E-cigarettes or smokeless tobacco still contain nicotine. Talk to your healthcare provider before you use these products. Limit or do not drink alcohol. Alcohol can increase your triglyceride levels. Ask your healthcare provider before you drink alcohol. Ask how much is okay for you to drink in 24 hours or 1 week. Follow up with your doctor as directed:  Write down your questions so you remember to ask them during your visits. © Copyright [employer] 2023 Information is for End User's use only and may not be sold, redistributed or otherwise used for commercial purposes. The above information is an  only. It is not intended as medical advice for individual conditions or treatments. Talk to your doctor, nurse or pharmacist before following any medical regimen to see if it is safe and effective for you.

## 2024-01-26 ENCOUNTER — APPOINTMENT (EMERGENCY)
Dept: CT IMAGING | Facility: HOSPITAL | Age: 44
End: 2024-01-26
Payer: COMMERCIAL

## 2024-01-26 ENCOUNTER — ANESTHESIA EVENT (OUTPATIENT)
Dept: PERIOP | Facility: HOSPITAL | Age: 44
End: 2024-01-26
Payer: COMMERCIAL

## 2024-01-26 ENCOUNTER — HOSPITAL ENCOUNTER (OUTPATIENT)
Facility: HOSPITAL | Age: 44
Setting detail: SURGERY ADMIT
End: 2024-01-26
Attending: UROLOGY | Admitting: UROLOGY
Payer: COMMERCIAL

## 2024-01-26 ENCOUNTER — HOSPITAL ENCOUNTER (OUTPATIENT)
Facility: HOSPITAL | Age: 44
Setting detail: OBSERVATION
Discharge: HOME | End: 2024-01-27
Attending: EMERGENCY MEDICINE | Admitting: UROLOGY
Payer: COMMERCIAL

## 2024-01-26 DIAGNOSIS — N20.0 KIDNEY STONE: ICD-10-CM

## 2024-01-26 DIAGNOSIS — R10.9 LEFT FLANK PAIN: ICD-10-CM

## 2024-01-26 DIAGNOSIS — N20.0 KIDNEY STONE: Primary | ICD-10-CM

## 2024-01-26 LAB
ANION GAP SERPL CALCULATED.3IONS-SCNC: 11 MMOL/L
BACTERIA UR QL AUTO: ABNORMAL /HPF
BASOPHILS # BLD AUTO: 0.05 THOUSANDS/ÂΜL (ref 0–0.1)
BASOPHILS NFR BLD AUTO: 1 % (ref 0–1)
BILIRUB UR QL STRIP: NEGATIVE
BUDDING YEAST: PRESENT
BUN SERPL-MCNC: 15 MG/DL (ref 5–25)
CALCIUM SERPL-MCNC: 8.4 MG/DL (ref 8.4–10.2)
CHLORIDE SERPL-SCNC: 109 MMOL/L (ref 96–108)
CLARITY UR: ABNORMAL
CO2 SERPL-SCNC: 18 MMOL/L (ref 21–32)
COLOR UR: YELLOW
CREAT SERPL-MCNC: 0.81 MG/DL (ref 0.6–1.3)
EOSINOPHIL # BLD AUTO: 0.14 THOUSAND/ÂΜL (ref 0–0.61)
EOSINOPHIL NFR BLD AUTO: 1 % (ref 0–6)
ERYTHROCYTE [DISTWIDTH] IN BLOOD BY AUTOMATED COUNT: 12 % (ref 11.6–15.1)
GFR SERPL CREATININE-BSD FRML MDRD: 88 ML/MIN/1.73SQ M
GLUCOSE SERPL-MCNC: 146 MG/DL (ref 65–140)
GLUCOSE UR STRIP-MCNC: NEGATIVE MG/DL
HCT VFR BLD AUTO: 40 % (ref 34.8–46.1)
HGB BLD-MCNC: 13.2 G/DL (ref 11.5–15.4)
HGB UR QL STRIP.AUTO: ABNORMAL
HYALINE CASTS #/AREA URNS LPF: ABNORMAL /LPF
IMM GRANULOCYTES # BLD AUTO: 0.06 THOUSAND/UL (ref 0–0.2)
IMM GRANULOCYTES NFR BLD AUTO: 1 % (ref 0–2)
KETONES UR STRIP-MCNC: NEGATIVE MG/DL
LEUKOCYTE ESTERASE UR QL STRIP: NEGATIVE
LYMPHOCYTES # BLD AUTO: 2.58 THOUSANDS/ÂΜL (ref 0.6–4.47)
LYMPHOCYTES NFR BLD AUTO: 26 % (ref 14–44)
MCH RBC QN AUTO: 30.3 PG (ref 26.8–34.3)
MCHC RBC AUTO-ENTMCNC: 33 G/DL (ref 31.4–37.4)
MCV RBC AUTO: 92 FL (ref 82–98)
MONOCYTES # BLD AUTO: 0.65 THOUSAND/ÂΜL (ref 0.17–1.22)
MONOCYTES NFR BLD AUTO: 7 % (ref 4–12)
MUCOUS THREADS UR QL AUTO: ABNORMAL
NEUTROPHILS # BLD AUTO: 6.38 THOUSANDS/ÂΜL (ref 1.85–7.62)
NEUTS SEG NFR BLD AUTO: 64 % (ref 43–75)
NITRITE UR QL STRIP: NEGATIVE
NON-SQ EPI CELLS URNS QL MICRO: ABNORMAL /HPF
NRBC BLD AUTO-RTO: 0 /100 WBCS
PH UR STRIP.AUTO: 5.5 [PH] (ref 4.5–8)
PLATELET # BLD AUTO: 285 THOUSANDS/UL (ref 149–390)
PMV BLD AUTO: 10.9 FL (ref 8.9–12.7)
POTASSIUM SERPL-SCNC: 3.7 MMOL/L (ref 3.5–5.3)
PROT UR STRIP-MCNC: ABNORMAL MG/DL
RBC # BLD AUTO: 4.36 MILLION/UL (ref 3.81–5.12)
RBC #/AREA URNS AUTO: ABNORMAL /HPF
SODIUM SERPL-SCNC: 138 MMOL/L (ref 135–147)
SP GR UR STRIP.AUTO: >=1.03 (ref 1–1.03)
UROBILINOGEN UR QL STRIP.AUTO: 0.2 E.U./DL
WBC # BLD AUTO: 9.86 THOUSAND/UL (ref 4.31–10.16)
WBC #/AREA URNS AUTO: ABNORMAL /HPF

## 2024-01-26 PROCEDURE — 96374 THER/PROPH/DIAG INJ IV PUSH: CPT

## 2024-01-26 PROCEDURE — 80048 BASIC METABOLIC PNL TOTAL CA: CPT | Performed by: EMERGENCY MEDICINE

## 2024-01-26 PROCEDURE — 81001 URINALYSIS AUTO W/SCOPE: CPT

## 2024-01-26 PROCEDURE — 85025 COMPLETE CBC W/AUTO DIFF WBC: CPT | Performed by: EMERGENCY MEDICINE

## 2024-01-26 PROCEDURE — 99284 EMERGENCY DEPT VISIT MOD MDM: CPT

## 2024-01-26 PROCEDURE — 74176 CT ABD & PELVIS W/O CONTRAST: CPT

## 2024-01-26 PROCEDURE — G1004 CDSM NDSC: HCPCS

## 2024-01-26 PROCEDURE — 99285 EMERGENCY DEPT VISIT HI MDM: CPT | Performed by: EMERGENCY MEDICINE

## 2024-01-26 PROCEDURE — 36415 COLL VENOUS BLD VENIPUNCTURE: CPT | Performed by: EMERGENCY MEDICINE

## 2024-01-26 PROCEDURE — 99222 1ST HOSP IP/OBS MODERATE 55: CPT | Performed by: UROLOGY

## 2024-01-26 PROCEDURE — 96361 HYDRATE IV INFUSION ADD-ON: CPT

## 2024-01-26 PROCEDURE — 96375 TX/PRO/DX INJ NEW DRUG ADDON: CPT

## 2024-01-26 PROCEDURE — 96376 TX/PRO/DX INJ SAME DRUG ADON: CPT

## 2024-01-26 RX ORDER — ONDANSETRON 2 MG/ML
4 INJECTION INTRAMUSCULAR; INTRAVENOUS EVERY 6 HOURS PRN
Status: DISCONTINUED | OUTPATIENT
Start: 2024-01-26 | End: 2024-01-27 | Stop reason: HOSPADM

## 2024-01-26 RX ORDER — ONDANSETRON 2 MG/ML
4 INJECTION INTRAMUSCULAR; INTRAVENOUS ONCE
Status: COMPLETED | OUTPATIENT
Start: 2024-01-26 | End: 2024-01-26

## 2024-01-26 RX ORDER — OXYCODONE HYDROCHLORIDE 5 MG/1
5 TABLET ORAL EVERY 4 HOURS PRN
Status: DISCONTINUED | OUTPATIENT
Start: 2024-01-26 | End: 2024-01-26

## 2024-01-26 RX ORDER — TAMSULOSIN HYDROCHLORIDE 0.4 MG/1
0.4 CAPSULE ORAL
Status: DISCONTINUED | OUTPATIENT
Start: 2024-01-26 | End: 2024-01-27 | Stop reason: HOSPADM

## 2024-01-26 RX ORDER — OXYCODONE HYDROCHLORIDE 5 MG/1
5 TABLET ORAL EVERY 4 HOURS PRN
Status: DISCONTINUED | OUTPATIENT
Start: 2024-01-26 | End: 2024-01-27 | Stop reason: HOSPADM

## 2024-01-26 RX ORDER — KETOROLAC TROMETHAMINE 30 MG/ML
15 INJECTION, SOLUTION INTRAMUSCULAR; INTRAVENOUS EVERY 6 HOURS PRN
Status: DISCONTINUED | OUTPATIENT
Start: 2024-01-26 | End: 2024-01-26

## 2024-01-26 RX ORDER — HYDROMORPHONE HCL/PF 1 MG/ML
1 SYRINGE (ML) INJECTION EVERY 6 HOURS PRN
Status: DISCONTINUED | OUTPATIENT
Start: 2024-01-26 | End: 2024-01-26

## 2024-01-26 RX ORDER — METOCLOPRAMIDE HYDROCHLORIDE 5 MG/ML
10 INJECTION INTRAMUSCULAR; INTRAVENOUS EVERY 6 HOURS PRN
Status: DISCONTINUED | OUTPATIENT
Start: 2024-01-26 | End: 2024-01-27 | Stop reason: HOSPADM

## 2024-01-26 RX ORDER — HYDROMORPHONE HCL/PF 1 MG/ML
1 SYRINGE (ML) INJECTION EVERY 6 HOURS PRN
Status: DISCONTINUED | OUTPATIENT
Start: 2024-01-26 | End: 2024-01-27 | Stop reason: HOSPADM

## 2024-01-26 RX ORDER — KETOROLAC TROMETHAMINE 30 MG/ML
15 INJECTION, SOLUTION INTRAMUSCULAR; INTRAVENOUS ONCE
Status: COMPLETED | OUTPATIENT
Start: 2024-01-26 | End: 2024-01-26

## 2024-01-26 RX ORDER — KETOROLAC TROMETHAMINE 30 MG/ML
15 INJECTION, SOLUTION INTRAMUSCULAR; INTRAVENOUS EVERY 6 HOURS SCHEDULED
Status: DISCONTINUED | OUTPATIENT
Start: 2024-01-26 | End: 2024-01-27 | Stop reason: HOSPADM

## 2024-01-26 RX ORDER — HYDROMORPHONE HCL/PF 1 MG/ML
1 SYRINGE (ML) INJECTION ONCE
Status: COMPLETED | OUTPATIENT
Start: 2024-01-26 | End: 2024-01-26

## 2024-01-26 RX ORDER — HYDROMORPHONE HCL/PF 1 MG/ML
0.5 SYRINGE (ML) INJECTION EVERY 4 HOURS PRN
Status: DISCONTINUED | OUTPATIENT
Start: 2024-01-26 | End: 2024-01-27 | Stop reason: HOSPADM

## 2024-01-26 RX ADMIN — HYDROMORPHONE HYDROCHLORIDE 1 MG: 1 INJECTION, SOLUTION INTRAMUSCULAR; INTRAVENOUS; SUBCUTANEOUS at 11:53

## 2024-01-26 RX ADMIN — KETOROLAC TROMETHAMINE 15 MG: 30 INJECTION, SOLUTION INTRAMUSCULAR; INTRAVENOUS at 19:56

## 2024-01-26 RX ADMIN — HYDROMORPHONE HYDROCHLORIDE 0.5 MG: 1 INJECTION, SOLUTION INTRAMUSCULAR; INTRAVENOUS; SUBCUTANEOUS at 18:26

## 2024-01-26 RX ADMIN — KETOROLAC TROMETHAMINE 15 MG: 30 INJECTION, SOLUTION INTRAMUSCULAR; INTRAVENOUS at 14:43

## 2024-01-26 RX ADMIN — SODIUM CHLORIDE 1000 ML: 0.9 INJECTION, SOLUTION INTRAVENOUS at 07:03

## 2024-01-26 RX ADMIN — ONDANSETRON 4 MG: 2 INJECTION INTRAMUSCULAR; INTRAVENOUS at 07:05

## 2024-01-26 RX ADMIN — HYDROMORPHONE HYDROCHLORIDE 1 MG: 1 INJECTION, SOLUTION INTRAMUSCULAR; INTRAVENOUS; SUBCUTANEOUS at 08:29

## 2024-01-26 RX ADMIN — KETOROLAC TROMETHAMINE 15 MG: 30 INJECTION, SOLUTION INTRAMUSCULAR; INTRAVENOUS at 07:04

## 2024-01-26 RX ADMIN — HYDROMORPHONE HYDROCHLORIDE 1 MG: 1 INJECTION, SOLUTION INTRAMUSCULAR; INTRAVENOUS; SUBCUTANEOUS at 07:34

## 2024-01-26 RX ADMIN — TAMSULOSIN HYDROCHLORIDE 0.4 MG: 0.4 CAPSULE ORAL at 22:30

## 2024-01-26 NOTE — ASSESSMENT & PLAN NOTE
Patient has history of nephrolithiasis, has undergone surgical intervention in the past  Presented to [facility] ED on 1/26/2024 with new onset left-sided flank pain, nausea/vomiting/diarrhea  CT scan shows 7 mm calculi in the left mid to distal ureter as well as several left intra renal calculi  UA negative for infection.  Creatinine stable at 0.81, WBC normal.  VSS, she is afebrile  At this time pain persists even with medication, nausea subsided she denies any other symptoms at this time  Patient will be admitted to the floor under the urologic service  Admission orders have been placed  Continue medical optimization including pain management as needed  Case request placed for cystoscopy left-sided ureteroscopy with laser lithotripsy and left-sided stent placement to be done tomorrow by Dr. Shiela Mca patient n.p.o. at this time  Surgical consent signed by patient and given to the OR  Urology will continue to follow closely

## 2024-01-26 NOTE — ED PROVIDER NOTES
History  Chief Complaint   Patient presents with    Flank Pain     Pt reports left sided flank pain, reports kidney stones. +n/v/d. Denies urinary symptoms. Oxycodone about 4:30am     A 43-year-old female with past medical history of kidney stones; presents with left flank and left lower quadrant abdominal pain that has progressed over the past 2 weeks.  Pain significantly worsened overnight.  She does report nausea and vomiting.  She last took oxycodone at 4 am with minimal relief.  Patient otherwise denies fever, chills, chest pain, shortness of breath, diarrhea, dysuria, peripheral edema and rashes.  Patient does have a history of kidney stones, stating this feels similar.  She has not seen urology since onset of her symptoms.      History provided by:  Patient and medical records  Flank Pain  Associated symptoms: nausea and vomiting        Prior to Admission Medications   Prescriptions Last Dose Informant Patient Reported? Taking?   Multiple Vitamin (ONE-A-DAY ADULT VITACRAVES+DHA PO) Not Taking Self Yes No   Sig: Take by mouth   Patient not taking: Reported on 1/26/2024   hydrOXYzine HCL (ATARAX) 25 mg tablet Not Taking  No No   Sig: Take 1 tablet (25 mg total) by mouth every 6 (six) hours as needed for itching   Patient not taking: Reported on 1/26/2024   medroxyPROGESTERone acetate (DEPO-PROVERA SYRINGE) 150 mg/mL injection  Self Yes Yes   Sig: Inject 150 mg into a muscle every 3 (three) months    tamsulosin (FLOMAX) 0.4 mg   No Yes   Sig: Take 1 tablet once daily prn while passing kidney stones      Facility-Administered Medications: None       Past Medical History:   Diagnosis Date    Abdominal pain     Acute deep vein thrombosis (DVT) of left lower extremity (HCC)     Depression     Distorted vision     Feeling of incomplete bladder emptying     Kidney stone     Nocturia     Otitis media     Stress incontinence of urine     Substance abuse (HCC)     Urinary frequency     Urinary tract infection     Visual  impairment     Weak urinary stream        Past Surgical History:   Procedure Laterality Date    APPENDECTOMY  2008     SECTION  2008, 2012    CYSTOSCOPY W/ RETROGRADES Right 2012    CYSTOSCOPY W/ URETERAL STENT REMOVAL Right 2012    CYSTOSCOPY W/ URETEROSCOPY W/ LITHOTRIPSY Right 2012    DECOMPRESSION FASCIOTOMY LEG Bilateral     FL RETROGRADE PYELOGRAM  2020    FL RETROGRADE PYELOGRAM  2021    ME CYSTO/URETERO W/LITHOTRIPSY &INDWELL STENT INSRT Left 2020    Procedure: CYSTOSCOPY URETEROSCOPY WITH LITHOTRIPSY HOLMIUM LASER, RETROGRADE PYELOGRAM AND INSERTION STENT URETERAL  STONE EXTRACTION;  Surgeon: Tigre Mao MD;  Location: [facility];  Service: Urology    ME CYSTO/URETERO W/LITHOTRIPSY &INDWELL STENT INSRT Left 2021    Procedure: CYSTOSCOPY URETEROSCOPY, RETROGRADE PYELOGRAM AND INSERTION STENT URETERAL;  Surgeon: Tigre Mao MD;  Location:  MAIN OR;  Service: Urology       Family History   Problem Relation Age of Onset    Cancer Family     Heart attack Family     Nephrolithiasis Family     Nephrolithiasis Family      I have reviewed and agree with the history as documented.    E-Cigarette/Vaping    E-Cigarette Use Never User      E-Cigarette/Vaping Substances    Nicotine No     THC No     CBD No     Flavoring No     Other No     Unknown No      Social History     Tobacco Use    Smoking status: Every Day     Current packs/day: 1.00     Types: Cigarettes    Smokeless tobacco: Never   Vaping Use    Vaping status: Never Used   Substance Use Topics    Alcohol use: No    Drug use: No       Review of Systems   Gastrointestinal:  Positive for abdominal pain, nausea and vomiting.   Genitourinary:  Positive for flank pain.   All other systems reviewed and are negative.      Physical Exam  Physical Exam  General Appearance: alert and oriented, non toxic appearing.  Appears uncomfortable  Skin:  Warm, dry, intact.  No cyanosis  HEENT: Atraumatic, normocephalic.  No eye drainage.   Normal hearing.  Moist mucous membranes.    Neck: Supple, trachea midline  Cardiac: RRR; no murmurs, rub, gallops.  No pedal edema, 2+ pulses  Pulmonary: lungs CTAB; no wheezes, rales, rhonchi  Gastrointestinal: abdomen soft, LLQ tenderness, nondistended; no guarding or rebound tenderness; good bowel sounds, no mass or bruits.  L CVA tenderness.  R CVA nontender.  Extremities:  No deformities.  No calf tenderness, no clubbing  Neuro:  no focal motor or sensory deficits, CN 2-12 grossly intact  Psych:  Normal mood and affect, normal judgement and insight    Vital Signs  ED Triage Vitals [01/26/24 0608]   Temperature Pulse Respirations Blood Pressure SpO2   97.9 °F (36.6 °C) (!) 45 18 129/68 97 %      Temp Source Heart Rate Source Patient Position - Orthostatic VS BP Location FiO2 (%)   Oral Monitor Sitting Left arm --      Pain Score       10 - Worst Possible Pain           Vitals:    01/26/24 0608 01/26/24 0800 01/26/24 1026 01/26/24 1123   BP: 129/68 131/62 120/66 108/62   Pulse: (!) 45 69 65 86   Patient Position - Orthostatic VS: Sitting Sitting Sitting Lying         Visual Acuity      ED Medications  Medications   nicotine (NICODERM CQ) 7 mg/24hr TD 24 hr patch 1 patch (1 patch Transdermal Not Given 1/26/24 1117)   tamsulosin (FLOMAX) capsule 0.4 mg (has no administration in time range)   ondansetron (ZOFRAN) injection 4 mg (has no administration in time range)   metoclopramide (REGLAN) injection 10 mg (has no administration in time range)   oxyCODONE (ROXICODONE) split tablet 2.5 mg (has no administration in time range)     Or   oxyCODONE (ROXICODONE) IR tablet 5 mg (has no administration in time range)   ketorolac (TORADOL) injection 15 mg (has no administration in time range)   HYDROmorphone (DILAUDID) injection 1 mg (has no administration in time range)   sodium chloride 0.9 % bolus 1,000 mL (0 mL Intravenous Stopped 1/26/24 0757)   ondansetron (ZOFRAN) injection 4 mg (4 mg Intravenous Given 1/26/24 0705)    ketorolac (TORADOL) injection 15 mg (15 mg Intravenous Given 1/26/24 0704)   HYDROmorphone (DILAUDID) injection 1 mg (1 mg Intravenous Given 1/26/24 0734)   HYDROmorphone (DILAUDID) injection 1 mg (1 mg Intravenous Given 1/26/24 0829)       Diagnostic Studies  Results Reviewed       Procedure Component Value Units Date/Time    Urine Microscopic [[id number]]  (Abnormal) Collected: 01/26/24 0711    Lab Status: Final result Specimen: Urine, Clean Catch Updated: 01/26/24 0844     RBC, UA Innumerable /hpf      WBC, UA 4-10 /hpf      Epithelial Cells Occasional /hpf      Bacteria, UA Occasional /hpf      MUCUS THREADS Occasional     Hyaline Casts, UA 0-3 /lpf      Budding Yeast Present    Basic metabolic panel [[id number]]  (Abnormal) Collected: 01/26/24 0622    Lab Status: Final result Specimen: Blood from Arm, Left Updated: 01/26/24 0734     Sodium 138 mmol/L      Potassium 3.7 mmol/L      Chloride 109 mmol/L      CO2 18 mmol/L      ANION GAP 11 mmol/L      BUN 15 mg/dL      Creatinine 0.81 mg/dL      Glucose 146 mg/dL      Calcium 8.4 mg/dL      eGFR 88 ml/min/1.73sq m     Narrative:      National Kid[employer] Disease Foundation guidelines for Chronic Kidney Disease (CKD):     Stage 1 with normal or high GFR (GFR > 90 mL/min/1.73 square meters)    Stage 2 Mild CKD (GFR = 60-89 mL/min/1.73 square meters)    Stage 3A Moderate CKD (GFR = 45-59 mL/min/1.73 square meters)    Stage 3B Moderate CKD (GFR = 30-44 mL/min/1.73 square meters)    Stage 4 Severe CKD (GFR = 15-29 mL/min/1.73 square meters)    Stage 5 End Stage CKD (GFR <15 mL/min/1.73 square meters)  Note: GFR calculation is accurate only with a steady state creatinine    CBC and differential [[id number]] Collected: 01/26/24 0622    Lab Status: Final result Specimen: Blood from Arm, Left Updated: 01/26/24 0715     WBC 9.86 Thousand/uL      RBC 4.36 Million/uL      Hemoglobin 13.2 g/dL      Hematocrit 40.0 %      MCV 92 fL      MCH 30.3 pg      MCHC 33.0 g/dL      RDW 12.0 %       MPV 10.9 fL      Platelets 285 Thousands/uL      nRBC 0 /100 WBCs      Neutrophils Relative 64 %      Immat GRANS % 1 %      Lymphocytes Relative 26 %      Monocytes Relative 7 %      Eosinophils Relative 1 %      Basophils Relative 1 %      Neutrophils Absolute 6.38 Thousands/µL      Immature Grans Absolute 0.06 Thousand/uL      Lymphocytes Absolute 2.58 Thousands/µL      Monocytes Absolute 0.65 Thousand/µL      Eosinophils Absolute 0.14 Thousand/µL      Basophils Absolute 0.05 Thousands/µL     Urine Macroscopic, POC [[id number]]  (Abnormal) Collected: 01/26/24 0711    Lab Status: Final result Specimen: Urine Updated: 01/26/24 0713     Color, UA Yellow     Clarity, UA Cloudy     pH, UA 5.5     Leukocytes, UA Negative     Nitrite, UA Negative     Protein, UA 30 (1+) mg/dl      Glucose, UA Negative mg/dl      Ketones, UA Negative mg/dl      Urobilinogen, UA 0.2 E.U./dl      Bilirubin, UA Negative     Occult Blood, UA Large     Specific Gravity, UA >=1.030    Narrative:      CL[facility] RESULT                   CT renal stone study abdomen pelvis without contrast   Final Result by Tigre Sanon MD (01/26 0807)      Mildly obstructing 7 mm calculus in the left mid to distal ureter.      Several left intrarenal calculi.         Workstation performed: [id number]                    Procedures  Procedures         ED Course  ED Course as of 01/26/24 1410   Fri Jan 26, 2024   0717 Urine Macroscopic, POC(!)  Negative for infection   0728 Patient with persistent pain, nausea has resolved.  Will give additional meds   0816 CT renal stone study abdomen pelvis without contrast  1.) Mildly obstructing 7 mm calculus in the left mid to distal ureter.  2.) Several left intrarenal calculi.   0824 Pt updated on results, reports pain somewhat improved with dilaudid although still present.  Will give additional dose of pain meds.  Given size of stone and intractable pain, will discuss with urology for admission/intervention.    7652 Spoke with urology AP, plan for admission to [facility] with likely intervention tonight or tomorrow.                               SBIRT 20yo+      Flowsheet Row Most Recent Value   Initial Alcohol Screen: US AUDIT-C     1. How often do you have a drink containing alcohol? 0 Filed at: 01/26/2024 0614   2. How many drinks containing alcohol do you have on a typical day you are drinking?  0 Filed at: 01/26/2024 0614   3b. FEMALE Any Age, or MALE 65+: How often do you have 4 or more drinks on one occassion? 0 Filed at: 01/26/2024 0614   Audit-C Score 0 Filed at: 01/26/2024 0614   BERENICE: How many times in the past year have you...    Used an illegal drug or used a prescription medication for non-medical reasons? Never Filed at: 01/26/2024 0614                      Medical Decision Making  A 43-year-old female presents with left flank and left lower quadrant abdominal pain that is progressed over the past 2 weeks.  Suspect underlying kidney stone.  Will check labs for MACARIO, urine for infection.  Will obtain CT renal stone protocol.  Will treat symptomatically.    Amount and/or Complexity of Data Reviewed  Labs: ordered. Decision-making details documented in ED Course.  Radiology: ordered. Decision-making details documented in ED Course.    Risk  Prescription drug management.  Decision regarding hospitalization.             Disposition  Final diagnoses:   Kidney stone - left     Time reflects when diagnosis was documented in both MDM as applicable and the Disposition within this note       Time User Action Codes Description Comment    1/26/2024  9:29 AM Shellie Mahoney Add [N20.0] Kidney stone     1/26/2024  9:29 AM Shellie Mahoney Modify [N20.0] Kidney stone left    1/26/2024 12:33 PM Latonia Heck Add [R10.9] Left flank pain           ED Disposition       ED Disposition   Admit    Condition   Stable    Date/Time   Fri Jan 26, 2024 2715    Comment   Case was discussed with Urology and the patient's admission status was  agreed to be Admission Status: observation status to the service of Dr. Kuo .               Follow-up Information    None         Current Discharge Medication List        CONTINUE these medications which have NOT CHANGED    Details   medroxyPROGESTERone acetate (DEPO-PROVERA SYRINGE) 150 mg/mL injection Inject 150 mg into a muscle every 3 (three) months       tamsulosin (FLOMAX) 0.4 mg Take 1 tablet once daily prn while passing kidney stones  Qty: 30 capsule, Refills: 1    Associated Diagnoses: Calculus of kidney      hydrOXYzine HCL (ATARAX) 25 mg tablet Take 1 tablet (25 mg total) by mouth every 6 (six) hours as needed for itching  Qty: 60 tablet, Refills: 3    Associated Diagnoses: Anxiety; PTSD (post-traumatic stress disorder)      Multiple Vitamin (ONE-A-DAY ADULT VITACRAVES+DHA PO) Take by mouth             No discharge procedures on file.    PDMP Review         Value Time User    PDMP Reviewed  Yes 9/14/2021  3:11 PM Tigre Mao MD            ED Provider  Electronically Signed by             Shellie Mahoney DO  01/26/24 9439

## 2024-01-26 NOTE — ANESTHESIA PREPROCEDURE EVALUATION
Procedure:  CYSTOSCOPY URETEROSCOPY WITH LITHOTRIPSY HOLMIUM LASER, RETROGRADE PYELOGRAM AND INSERTION STENT URETERAL (Left: Bladder)    Relevant Problems   /RENAL   (+) Calculus of kidney      NEURO/PSYCH   (+) Anxiety   (+) PTSD (post-traumatic stress disorder)        Physical Exam    Airway    Mallampati score: II         Dental       Cardiovascular  Rhythm: regular    Pulmonary   Breath sounds clear to auscultation    Other Findings  Upper front capspost-pubertal.      Anesthesia Plan  ASA Score- 2 Emergent    Anesthesia Type- general with ASA Monitors.         Additional Monitors:     Airway Plan:            Plan Factors-Exercise tolerance (METS): >4 METS.            Patient is a current smoker. Patient not instructed to abstain from smoking on day of procedure. Patient did not smoke on day of surgery.    Obstructive sleep apnea risk education given perioperatively.        Induction- intravenous.    Postoperative Plan-     Informed Consent- Anesthetic plan and risks discussed with patient.

## 2024-01-26 NOTE — PLAN OF CARE
Problem: Potential for Falls  Goal: Patient will remain free of falls  Description: INTERVENTIONS:  - Educate patient/family on patient safety including physical limitations  - Instruct patient to call for assistance with activity   - Consult OT/PT to assist with strengthening/mobility   - Keep Call bell within reach  - Keep bed low and locked with side rails adjusted as appropriate  - Keep care items and personal belongings within reach  - Initiate and maintain comfort rounds  - Make Fall Risk Sign visible to staff  - Offer Toileting every 2 Hours, in advance of need  - Initiate/Maintain bed alarm  - Obtain necessary fall risk management equipment:   - Apply yellow socks and bracelet for high fall risk patients  - Consider moving patient to room near nurses station  Outcome: Progressing     Problem: PAIN - ADULT  Goal: Verbalizes/displays adequate comfort level or baseline comfort level  Description: Interventions:  - Encourage patient to monitor pain and request assistance  - Assess pain using appropriate pain scale  - Administer analgesics based on type and severity of pain and evaluate response  - Implement non-pharmacological measures as appropriate and evaluate response  - Consider cultural and social influences on pain and pain management  - Notify physician/advanced practitioner if interventions unsuccessful or patient reports new pain  Outcome: Progressing     Problem: GENITOURINARY - ADULT  Goal: Maintains or returns to baseline urinary function  Description: INTERVENTIONS:  - Assess urinary function  - Encourage oral fluids to ensure adequate hydration if ordered  - Administer IV fluids as ordered to ensure adequate hydration  - Administer ordered medications as needed  - Offer frequent toileting  - Follow urinary retention protocol if ordered  Outcome: Progressing     Problem: METABOLIC, FLUID AND ELECTROLYTES - ADULT  Goal: Electrolytes maintained within normal limits  Description: INTERVENTIONS:  -  Monitor labs and assess patient for signs and symptoms of electrolyte imbalances  - Administer electrolyte replacement as ordered  - Monitor response to electrolyte replacements, including repeat lab results as appropriate  - Instruct patient on fluid and nutrition as appropriate  Outcome: Progressing

## 2024-01-26 NOTE — ED NOTES
Pt advised to stop drinking water at bedside as she is ordered NPO for surgery this PM.      Keyla Gilliam RN  01/26/24 4109

## 2024-01-26 NOTE — H&P
H&P - Urology   Adilia Leos 1980, 43 y.o. female MRN: [id number]    Unit/Bed#: [facility] Encounter: [id number]      Left ureteral stone  Assessment & Plan  Patient has history of nephrolithiasis, has undergone surgical intervention in the past  Presented to [facility] ED on 1/26/2024 with new onset left-sided flank pain, nausea/vomiting/diarrhea  CT scan shows 7 mm calculi in the left mid to distal ureter as well as several left intra renal calculi  UA negative for infection.  Creatinine stable at 0.81, WBC normal.  VSS, she is afebrile  At this time pain persists even with medication, nausea subsided she denies any other symptoms at this time  Patient will be admitted to the floor under the urologic service  Admission orders have been placed  Continue medical optimization including pain management as needed  Case request placed for cystoscopy left-sided ureteroscopy with laser lithotripsy and left-sided stent placement to be done tomorrow by Dr. Kuo  Keep patient n.p.o. at this time  Surgical consent signed by patient and given to the OR  Urology will continue to follow closely      Subjective:   Adilia is a 43-year-old female with past history of nephrolithiasis who reported to [facility] ED early this morning with new onset left-sided flank pain, nausea/vomiting/diarrhea.  At presentation she denied other urinary symptoms, fever/chills, difficulty urinating.  She states the pain began 2 weeks ago and has been worsening over time.  Patient was giving oxycodone in emergency department with minimal relief.  CT was completed and shows mildly obstructing 7 mm calculi in the left mid to distal ureter as well as several left intrarenal calculi.  Patient's VSS, she is afebrile.  Creatinine is stable at 0.81.  WBC normal at 9.86.  UA came back negative for infection.    In speaking with patient, she is still having pain located at the left flank and radiating to the left groin and left lower quadrant.  She rates the pain  as a 7/10 even on pain medication.  Her nausea has subsided.  Patient is still able to urinate normally.  She states that her urine looks darker than normal, but denies blood.  Patient also denies fever/chills, vomiting, urinary frequency/urgency, incomplete emptying, chest pain, or shortness of breath.    At this time patient will be admitted to the floor under the urologic service.  Admission orders have been placed.  We will continue to monitor her pain and labs throughout the day.  Continue medical optimization, pain management, and encourage fluid intake as needed.  Case request has been placed for cystoscopy, LEFT side ureteroscopy with laser lithotripsy, and left-sided stent placement to be done tomorrow morning 1/27/2024 by Dr. Kuo.  Patient signed surgical consent and it was given to the OR.  Urology will continue to follow closely.    Review of Systems   Constitutional:  Negative for activity change, chills and fever.   Respiratory:  Negative for apnea, cough and choking.    Cardiovascular:  Negative for chest pain and leg swelling.   Gastrointestinal:  Positive for abdominal pain (LLQ and left groin). Negative for abdominal distention, constipation, diarrhea, nausea and vomiting.   Genitourinary:  Positive for flank pain (left side). Negative for difficulty urinating, dysuria, frequency, hematuria, pelvic pain, urgency, vaginal bleeding and vaginal discharge.        Reports urine is dark   Musculoskeletal:  Negative for arthralgias and back pain.   Neurological:  Negative for dizziness and headaches.   Psychiatric/Behavioral: Negative.     All other systems reviewed and are negative.      Objective:  Vitals: Blood pressure 120/66, pulse 65, temperature 98.3 °F (36.8 °C), temperature source Oral, resp. rate 16, weight 78 kg (171 lb 15.3 oz), SpO2 99%.,Body mass index is 28.62 kg/m².    Physical Exam  Constitutional:       General: She is not in acute distress.     Appearance: Normal appearance. She is  not ill-appearing.   HENT:      Head: Normocephalic and atraumatic.      Right Ear: External ear normal.      Left Ear: External ear normal.      Nose: Nose normal.      Mouth/Throat:      Pharynx: Oropharynx is clear.   Eyes:      Extraocular Movements: Extraocular movements intact.      Conjunctiva/sclera: Conjunctivae normal.   Cardiovascular:      Rate and Rhythm: Normal rate and regular rhythm.      Pulses: Normal pulses.   Pulmonary:      Effort: Pulmonary effort is normal.      Breath sounds: Normal breath sounds.   Abdominal:      General: Abdomen is flat. There is no distension.      Tenderness: There is abdominal tenderness (LLQ). There is left CVA tenderness. There is no right CVA tenderness.   Musculoskeletal:         General: Normal range of motion.      Cervical back: Normal range of motion.      Right lower leg: No edema.      Left lower leg: No edema.   Neurological:      General: No focal deficit present.      Mental Status: She is alert and oriented to person, place, and time.   Psychiatric:         Mood and Affect: Mood normal.         Behavior: Behavior normal.         Imaging:    CT ABDOMEN AND PELVIS WITHOUT IV CONTRAST - LOW DOSE RENAL STONE    INDICATION: Flank pain, kidney stone suspected  L flank pain.    COMPARISON: None.    TECHNIQUE: Low radiation dose thin section CT examination of the abdomen and pelvis was performed without intravenous or oral contrast according to a protocol specifically designed to evaluate for urinary tract calculus. Axial, sagittal, and coronal 2D  reformatted images were created from the source data and submitted for interpretation. Evaluation for pathology in the abdomen and pelvis that is unrelated to urinary tract calculi is limited.    Radiation dose length product (DLP) for this visit: 259 mGy-cm . This examination, like all CT scans performed in the [facility], was performed utilizing techniques to minimize radiation dose exposure,  including the use of iterative  reconstruction and automated exposure control.    URINARY TRACT FINDINGS:    RIGHT KIDNEY AND URETER: No urinary tract calculi. No hydronephrosis or hydroureter.    LEFT KIDNEY AND URETER: Mild hydroureteronephrosis secondary to a 7 mm calculus in the mid to distal ureter, at the level of the sacrum. There is mild perinephric stranding.  6 mm and 5 mm calculi in the mid and lower poles of the kidney.    URINARY BLADDER: Unremarkable.      ADDITIONAL FINDINGS:    LOWER CHEST: No clinically significant abnormality in the visualized lower chest.    SOLID VISCERA: Limited low radiation dose noncontrast CT evaluation demonstrates no clinically significant abnormality of the imaged portions of the liver, spleen, pancreas, or adrenal glands.    GALLBLADDER/BILIARY TREE: No calcified gallstones. No pericholecystic inflammatory change. No biliary dilatation.    STOMACH AND BOWEL: Unremarkable.    APPENDIX: Surgically absent.    ABDOMINOPELVIC CAVITY: No ascites. No pneumoperitoneum. No lymphadenopathy.    VESSELS: Unremarkable for patient's age.    REPRODUCTIVE ORGANS: Unremarkable for patient's age.    ABDOMINAL WALL/INGUINAL REGIONS: Unremarkable.    BONES: No acute fracture or suspicious osseous lesion.   Impression:       Mildly obstructing 7 mm calculus in the left mid to distal ureter.    Several left intrarenal calculi.     Imaging reviewed - both report and images personally reviewed.     Labs:  Recent Labs     01/26/24  0622   WBC 9.86     Recent Labs     01/26/24  0622   HGB 13.2       Recent Labs     01/26/24  0622   CREATININE 0.81       Microbiology:  UA shows no signs of infection, no urine culture sent at this time.    History:  Social History     Socioeconomic History    Marital status: /Civil Union     Spouse name: PATRICIO JONES    Number of children: 2    Years of education: None    Highest education level: None   Occupational History    Occupation: Retail     Comment:  Part-time   Tobacco Use    Smoking status: Every Day     Current packs/day: 1.00     Types: Cigarettes    Smokeless tobacco: Never   Vaping Use    Vaping status: Never Used   Substance and Sexual Activity    Alcohol use: No    Drug use: No    Sexual activity: Yes     Partners: Male     Comment: -PATRICIO JONES   Other Topics Concern    None   Social History Narrative    None     Social Determinants of Health     Financial Resource Strain: Low Risk  (6/8/2021)    Overall Financial Resource Strain (CARDIA)     Difficulty of Paying Living Expenses: Not hard at all   Food Insecurity: No Food Insecurity (6/8/2021)    Hunger Vital Sign     Worried About Running Out of Food in the Last Year: Never true     Ran Out of Food in the Last Year: Never true   Transportation Needs: No Transportation Needs (6/8/2021)    PRAPARE - Transportation     Lack of Transportation (Medical): No     Lack of Transportation (Non-Medical): No   Physical Activity: Insufficiently Active (6/8/2021)    Exercise Vital Sign     Days of Exercise per Week: 3 days     Minutes of Exercise per Session: 30 min   Stress: No Stress Concern Present (6/8/2021)    [employer] - Occupational Stress Questionnaire     Feeling of Stress : Only a little   Social Connections: Unknown (6/8/2021)    Social Connection and Isolation Panel [NHANES]     Frequency of Communication with Friends and Family: More than three times a week     Frequency of Social Gatherings with Friends and Family: More than three times a week     Attends Voodoo Services: Not on file     Active Member of Clubs or Organizations: No     Attends Club or Organization Meetings: Never     Marital Status:    Intimate Partner Violence: Not At Risk (6/8/2021)    Humiliation, Afraid, Rape, and Kick questionnaire     Fear of Current or Ex-Partner: No     Emotionally Abused: No     Physically Abused: No     Sexually Abused: No   Housing Stability: Not on file        Past Medical History:   Diagnosis Date    Abdominal pain     Acute deep vein thrombosis (DVT) of left lower extremity (HCC)     Depression     Distorted vision     Feeling of incomplete bladder emptying     Kidney stone     Nocturia     Otitis media     Stress incontinence of urine     Substance abuse (HCC)     Urinary frequency     Urinary tract infection     Visual impairment     Weak urinary stream      Past Surgical History:   Procedure Laterality Date    APPENDECTOMY  2008     SECTION  ,     CYSTOSCOPY W/ RETROGRADES Right 2012    CYSTOSCOPY W/ URETERAL STENT REMOVAL Right 2012    CYSTOSCOPY W/ URETEROSCOPY W/ LITHOTRIPSY Right 2012    DECOMPRESSION FASCIOTOMY LEG Bilateral     FL RETROGRADE PYELOGRAM  2020    FL RETROGRADE PYELOGRAM  2021    [place] CYSTO/URETERO W/LITHOTRIPSY &INDWELL STENT INSRT Left 2020    Procedure: CYSTOSCOPY URETEROSCOPY WITH LITHOTRIPSY HOLMIUM LASER, RETROGRADE PYELOGRAM AND INSERTION STENT URETERAL  STONE EXTRACTION;  Surgeon: Tigre Mao MD;  Location: [facility];  Service: Urology    [place] CYSTO/URETERO W/LITHOTRIPSY &INDWELL STENT INSRT Left 2021    Procedure: CYSTOSCOPY URETEROSCOPY, RETROGRADE PYELOGRAM AND INSERTION STENT URETERAL;  Surgeon: Tigre Mao MD;  Location:  MAIN OR;  Service: Urology     Family History   Problem Relation Age of Onset    Cancer Family     Heart attack Family     Nephrolithiasis Family     Nephrolithiasis Family        Latonia Heck PA-C  Date: 2024 Time: 11:06 AM

## 2024-01-27 ENCOUNTER — ANESTHESIA (OUTPATIENT)
Dept: PERIOP | Facility: HOSPITAL | Age: 44
End: 2024-01-27
Payer: COMMERCIAL

## 2024-01-27 ENCOUNTER — APPOINTMENT (OUTPATIENT)
Dept: RADIOLOGY | Facility: HOSPITAL | Age: 44
End: 2024-01-27
Payer: COMMERCIAL

## 2024-01-27 VITALS
WEIGHT: 171.96 LBS | TEMPERATURE: 97.8 F | BODY MASS INDEX: 29.36 KG/M2 | RESPIRATION RATE: 18 BRPM | HEART RATE: 78 BPM | SYSTOLIC BLOOD PRESSURE: 102 MMHG | DIASTOLIC BLOOD PRESSURE: 56 MMHG | HEIGHT: 64 IN | OXYGEN SATURATION: 97 %

## 2024-01-27 LAB
ANION GAP SERPL CALCULATED.3IONS-SCNC: 6 MMOL/L
BUN SERPL-MCNC: 21 MG/DL (ref 5–25)
CALCIUM SERPL-MCNC: 8.4 MG/DL (ref 8.4–10.2)
CHLORIDE SERPL-SCNC: 109 MMOL/L (ref 96–108)
CO2 SERPL-SCNC: 23 MMOL/L (ref 21–32)
CREAT SERPL-MCNC: 0.87 MG/DL (ref 0.6–1.3)
ERYTHROCYTE [DISTWIDTH] IN BLOOD BY AUTOMATED COUNT: 12.2 % (ref 11.6–15.1)
GFR SERPL CREATININE-BSD FRML MDRD: 81 ML/MIN/1.73SQ M
GLUCOSE P FAST SERPL-MCNC: 87 MG/DL (ref 65–99)
GLUCOSE SERPL-MCNC: 87 MG/DL (ref 65–140)
HCT VFR BLD AUTO: 33.9 % (ref 34.8–46.1)
HGB BLD-MCNC: 11.2 G/DL (ref 11.5–15.4)
MCH RBC QN AUTO: 30.4 PG (ref 26.8–34.3)
MCHC RBC AUTO-ENTMCNC: 33 G/DL (ref 31.4–37.4)
MCV RBC AUTO: 92 FL (ref 82–98)
PLATELET # BLD AUTO: 205 THOUSANDS/UL (ref 149–390)
PMV BLD AUTO: 10.6 FL (ref 8.9–12.7)
POTASSIUM SERPL-SCNC: 4.1 MMOL/L (ref 3.5–5.3)
RBC # BLD AUTO: 3.69 MILLION/UL (ref 3.81–5.12)
SODIUM SERPL-SCNC: 138 MMOL/L (ref 135–147)
WBC # BLD AUTO: 8.94 THOUSAND/UL (ref 4.31–10.16)

## 2024-01-27 PROCEDURE — C1894 INTRO/SHEATH, NON-LASER: HCPCS | Performed by: UROLOGY

## 2024-01-27 PROCEDURE — 52356 CYSTO/URETERO W/LITHOTRIPSY: CPT | Performed by: UROLOGY

## 2024-01-27 PROCEDURE — C2617 STENT, NON-COR, TEM W/O DEL: HCPCS | Performed by: UROLOGY

## 2024-01-27 PROCEDURE — C1769 GUIDE WIRE: HCPCS | Performed by: UROLOGY

## 2024-01-27 PROCEDURE — C1758 CATHETER, URETERAL: HCPCS | Performed by: UROLOGY

## 2024-01-27 PROCEDURE — 74420 UROGRAPHY RTRGR +-KUB: CPT

## 2024-01-27 PROCEDURE — 99024 POSTOP FOLLOW-UP VISIT: CPT | Performed by: UROLOGY

## 2024-01-27 PROCEDURE — 87086 URINE CULTURE/COLONY COUNT: CPT | Performed by: UROLOGY

## 2024-01-27 PROCEDURE — 85027 COMPLETE CBC AUTOMATED: CPT

## 2024-01-27 PROCEDURE — 82360 CALCULUS ASSAY QUANT: CPT | Performed by: UROLOGY

## 2024-01-27 PROCEDURE — 80048 BASIC METABOLIC PNL TOTAL CA: CPT

## 2024-01-27 DEVICE — STENT URETERAL 6FR 22CM INLAY OPTIMA W/NITINOL GDWR: Type: IMPLANTABLE DEVICE | Site: URETER | Status: FUNCTIONAL

## 2024-01-27 RX ORDER — FENTANYL CITRATE 50 UG/ML
INJECTION, SOLUTION INTRAMUSCULAR; INTRAVENOUS AS NEEDED
Status: DISCONTINUED | OUTPATIENT
Start: 2024-01-27 | End: 2024-01-27

## 2024-01-27 RX ORDER — FENTANYL CITRATE/PF 50 MCG/ML
50 SYRINGE (ML) INJECTION
Status: DISCONTINUED | OUTPATIENT
Start: 2024-01-27 | End: 2024-01-27 | Stop reason: HOSPADM

## 2024-01-27 RX ORDER — PROPOFOL 10 MG/ML
INJECTION, EMULSION INTRAVENOUS AS NEEDED
Status: DISCONTINUED | OUTPATIENT
Start: 2024-01-27 | End: 2024-01-27

## 2024-01-27 RX ORDER — MIDAZOLAM HYDROCHLORIDE 2 MG/2ML
INJECTION, SOLUTION INTRAMUSCULAR; INTRAVENOUS AS NEEDED
Status: DISCONTINUED | OUTPATIENT
Start: 2024-01-27 | End: 2024-01-27

## 2024-01-27 RX ORDER — CEFAZOLIN SODIUM 1 G/50ML
SOLUTION INTRAVENOUS AS NEEDED
Status: DISCONTINUED | OUTPATIENT
Start: 2024-01-27 | End: 2024-01-27

## 2024-01-27 RX ORDER — MAGNESIUM HYDROXIDE 1200 MG/15ML
LIQUID ORAL AS NEEDED
Status: DISCONTINUED | OUTPATIENT
Start: 2024-01-27 | End: 2024-01-27 | Stop reason: HOSPADM

## 2024-01-27 RX ORDER — TAMSULOSIN HYDROCHLORIDE 0.4 MG/1
0.4 CAPSULE ORAL
Qty: 5 CAPSULE | Refills: 0 | Status: SHIPPED | OUTPATIENT
Start: 2024-01-27 | End: 2024-02-01

## 2024-01-27 RX ORDER — PHENAZOPYRIDINE HYDROCHLORIDE 100 MG/1
100 TABLET, FILM COATED ORAL 3 TIMES DAILY PRN
Qty: 6 TABLET | Refills: 0 | Status: SHIPPED | OUTPATIENT
Start: 2024-01-27 | End: 2024-01-29

## 2024-01-27 RX ORDER — OXYBUTYNIN CHLORIDE 5 MG/1
10 TABLET, EXTENDED RELEASE ORAL DAILY
Qty: 10 TABLET | Refills: 0 | Status: SHIPPED | OUTPATIENT
Start: 2024-01-27 | End: 2024-02-01

## 2024-01-27 RX ORDER — DEXAMETHASONE SODIUM PHOSPHATE 10 MG/ML
INJECTION, SOLUTION INTRAMUSCULAR; INTRAVENOUS AS NEEDED
Status: DISCONTINUED | OUTPATIENT
Start: 2024-01-27 | End: 2024-01-27

## 2024-01-27 RX ORDER — LIDOCAINE HYDROCHLORIDE 10 MG/ML
INJECTION, SOLUTION EPIDURAL; INFILTRATION; INTRACAUDAL; PERINEURAL AS NEEDED
Status: DISCONTINUED | OUTPATIENT
Start: 2024-01-27 | End: 2024-01-27

## 2024-01-27 RX ORDER — SODIUM CHLORIDE 9 MG/ML
INJECTION, SOLUTION INTRAVENOUS CONTINUOUS PRN
Status: DISCONTINUED | OUTPATIENT
Start: 2024-01-27 | End: 2024-01-27

## 2024-01-27 RX ORDER — ONDANSETRON 2 MG/ML
INJECTION INTRAMUSCULAR; INTRAVENOUS AS NEEDED
Status: DISCONTINUED | OUTPATIENT
Start: 2024-01-27 | End: 2024-01-27

## 2024-01-27 RX ORDER — ONDANSETRON 2 MG/ML
4 INJECTION INTRAMUSCULAR; INTRAVENOUS ONCE AS NEEDED
Status: DISCONTINUED | OUTPATIENT
Start: 2024-01-27 | End: 2024-01-27 | Stop reason: HOSPADM

## 2024-01-27 RX ADMIN — FENTANYL CITRATE 50 MCG: 50 INJECTION INTRAMUSCULAR; INTRAVENOUS at 11:00

## 2024-01-27 RX ADMIN — KETOROLAC TROMETHAMINE 30 MG: 30 INJECTION, SOLUTION INTRAMUSCULAR; INTRAVENOUS at 11:36

## 2024-01-27 RX ADMIN — MIDAZOLAM 2 MG: 1 INJECTION INTRAMUSCULAR; INTRAVENOUS at 10:51

## 2024-01-27 RX ADMIN — FENTANYL CITRATE 50 MCG: 50 INJECTION INTRAMUSCULAR; INTRAVENOUS at 10:57

## 2024-01-27 RX ADMIN — CEFAZOLIN SODIUM 1000 MG: 1 SOLUTION INTRAVENOUS at 10:55

## 2024-01-27 RX ADMIN — PROPOFOL 200 MG: 10 INJECTION, EMULSION INTRAVENOUS at 10:57

## 2024-01-27 RX ADMIN — DEXAMETHASONE SODIUM PHOSPHATE 10 MG: 10 INJECTION INTRAMUSCULAR; INTRAVENOUS at 11:06

## 2024-01-27 RX ADMIN — FENTANYL CITRATE 50 MCG: 0.05 INJECTION, SOLUTION INTRAMUSCULAR; INTRAVENOUS at 12:08

## 2024-01-27 RX ADMIN — OXYCODONE 5 MG: 5 TABLET ORAL at 13:13

## 2024-01-27 RX ADMIN — HYDROMORPHONE HYDROCHLORIDE 0.5 MG: 1 INJECTION, SOLUTION INTRAMUSCULAR; INTRAVENOUS; SUBCUTANEOUS at 06:22

## 2024-01-27 RX ADMIN — SODIUM CHLORIDE: 0.9 INJECTION, SOLUTION INTRAVENOUS at 10:49

## 2024-01-27 RX ADMIN — KETOROLAC TROMETHAMINE 15 MG: 30 INJECTION, SOLUTION INTRAMUSCULAR; INTRAVENOUS at 02:25

## 2024-01-27 RX ADMIN — ONDANSETRON 4 MG: 2 INJECTION INTRAMUSCULAR; INTRAVENOUS at 11:06

## 2024-01-27 RX ADMIN — LIDOCAINE HYDROCHLORIDE 60 MG: 10 INJECTION, SOLUTION EPIDURAL; INFILTRATION; INTRACAUDAL; PERINEURAL at 10:57

## 2024-01-27 RX ADMIN — FENTANYL CITRATE 50 MCG: 0.05 INJECTION, SOLUTION INTRAMUSCULAR; INTRAVENOUS at 12:14

## 2024-01-27 NOTE — ANESTHESIA POSTPROCEDURE EVALUATION
"Post-Op Assessment Note    CV Status:  Stable  Pain Score: 2    Pain management: adequate       Mental Status:  Alert and awake   Hydration Status:  Euvolemic and stable   PONV Controlled:  Controlled   Airway Patency:  Patent     Post Op Vitals Reviewed: Yes    No anethesia notable event occurred.    Staff: Anesthesiologist               BP      Temp      Pulse     Resp      SpO2      /57 (BP Location: Right arm)   Pulse 72   Temp 97.6 °F (36.4 °C) (Oral)   Resp 18   Ht 5' 4\" (1.626 m)   Wt 78 kg (171 lb 15.3 oz)   SpO2 97%   BMI 29.52 kg/m²     "

## 2024-01-27 NOTE — UTILIZATION REVIEW
Initial Clinical Review    Admission: Date/Time/Statement:   Admission Orders (From admission, onward)       Ordered        01/26/24 0930  Place in Observation  Once                          Orders Placed This Encounter   Procedures    Place in Observation     Standing Status:   Standing     Number of Occurrences:   1     Order Specific Question:   Level of Care     Answer:   Med Surg [16]     ED Arrival Information       Expected   -    Arrival   1/26/2024 06:02    Acuity   Urgent              Means of arrival   Walk-In    Escorted by   Self    Service   Urology    Admission type   Emergency              Arrival complaint   flank pain             Chief Complaint   Patient presents with    Flank Pain     Pt reports left sided flank pain, reports kidney stones. +n/v/d. Denies urinary symptoms. Oxycodone about 4:30am       Initial Presentation: 43 y.o. female who presented self from home to [facility] ED. Admitted in observation status for evaluation and treatment of kidney stone. PMHx: DVT, substance use, psychiatric disorder, nephrolithiasis. Presented w/ new onset L sided flank pain, n/v/d worsening over past 2 weeks. Imaging showed obstructing 7 mm calculi. On exam, LLQ and L CVA tenderness. Plan: IVF, IV ABX, analgesics, antiemeitcs, to OR for cysto tomorrow, NPO, Trend labs, replete electrolytes as needed; supportive care.     ED Triage Vitals [01/26/24 0608]   Temperature Pulse Respirations Blood Pressure SpO2   97.9 °F (36.6 °C) (!) 45 18 129/68 97 %      Temp Source Heart Rate Source Patient Position - Orthostatic VS BP Location FiO2 (%)   Oral Monitor Sitting Left arm --      Pain Score       10 - Worst Possible Pain          Wt Readings from Last 1 Encounters:   01/26/24 78 kg (171 lb 15.3 oz)     Additional Vital Signs:   Date/Time Temp Pulse Resp BP MAP (mmHg) SpO2 O2 Device   01/26/24 2300 99.3 °F (37.4 °C) 65 18 97/53 72 96 % None (Room air)   01/26/24 1536 99.1 °F (37.3 °C) 75 18 101/87 -- 94  "% None (Room air)   01/26/24 1123 -- 86 18 108/62 -- -- --   01/26/24 1026 -- 65 16 120/66 -- 99 % None (Room air)   01/26/24 0829 98.3 °F (36.8 °C) -- -- -- -- -- --   01/26/24 0800 -- 69 16 131/62 -- 98 % None (Room air)     Pertinent Labs/Diagnostic Test Results:   CT renal stone study abdomen pelvis without contrast   Final Result by Tigre Sanon MD (01/26 0807)      Mildly obstructing 7 mm calculus in the left mid to distal ureter.      Several left intrarenal calculi.         Workstation performed: [id number]         FL < 1 hour    (Results Pending)         Results from last 7 days   Lab Units 01/27/24  0447 01/26/24  0622   WBC Thousand/uL 8.94 9.86   HEMOGLOBIN g/dL 11.2* 13.2   HEMATOCRIT % 33.9* 40.0   PLATELETS Thousands/uL 205 285   NEUTROS ABS Thousands/µL  --  6.38         Results from last 7 days   Lab Units 01/27/24  0447 01/26/24  0622   SODIUM mmol/L 138 138   POTASSIUM mmol/L 4.1 3.7   CHLORIDE mmol/L 109* 109*   CO2 mmol/L 23 18*   ANION GAP mmol/L 6 11   BUN mg/dL 21 15   CREATININE mg/dL 0.87 0.81   EGFR ml/min/1.73sq m 81 88   CALCIUM mg/dL 8.4 8.4             Results from last 7 days   Lab Units 01/27/24  0447 01/26/24  0622   GLUCOSE RANDOM mg/dL 87 146*             No results found for: \"BETA-HYDROXYBUTYRATE\"                                                                                       Results from last 7 days   Lab Units 01/26/24  0711   CLARITY UA  Cloudy   COLOR UA  Yellow   SPEC GRAV UA  >=1.030   PH UA  5.5   GLUCOSE UA mg/dl Negative   KETONES UA mg/dl Negative   BLOOD UA  Large*   PROTEIN UA mg/dl 30 (1+)*   NITRITE UA  Negative   BILIRUBIN UA  Negative   UROBILINOGEN UA E.U./dl 0.2   LEUKOCYTES UA  Negative   WBC UA /hpf 4-10*   RBC UA /hpf Innumerable*   BACTERIA UA /hpf Occasional   EPITHELIAL CELLS WET PREP /hpf Occasional   MUCUS THREADS  Occasional*                                                   ED Treatment:   Medication Administration from 01/26/2024 0602 to " 01/26/2024 1107         Date/Time Order Dose Route Action     01/26/2024 0703 EST sodium chloride 0.9 % bolus 1,000 mL 1,000 mL Intravenous New Bag     01/26/2024 0705 EST ondansetron (ZOFRAN) injection 4 mg 4 mg Intravenous Given     01/26/2024 0704 EST ketorolac (TORADOL) injection 15 mg 15 mg Intravenous Given     01/26/2024 0734 EST HYDROmorphone (DILAUDID) injection 1 mg 1 mg Intravenous Given     01/26/2024 0829 EST HYDROmorphone (DILAUDID) injection 1 mg 1 mg Intravenous Given          Past Medical History:   Diagnosis Date    Abdominal pain     Acute deep vein thrombosis (DVT) of left lower extremity (HCC)     Depression     Distorted vision     Feeling of incomplete bladder emptying     Kidney stone     Nocturia     Otitis media     Stress incontinence of urine     Substance abuse (HCC)     Urinary frequency     Urinary tract infection     Visual impairment     Weak urinary stream      Present on Admission:   Left ureteral stone      Admitting Diagnosis: Kidney stone [N20.0]  Flank pain [R10.9]  Age/Sex: 43 y.o. female  Admission Orders:  NPO.  Activity, Up & OOB as tolerated.  SCDs.  Strain all urine.    Scheduled Medications:  ketorolac, 15 mg, Intravenous, Q6H YAZAN  nicotine, 1 patch, Transdermal, Daily  tamsulosin, 0.4 mg, Oral, HS    Continuous IV Infusions:     PRN Meds:  HYDROmorphone, 0.5 mg, Intravenous, Q4H PRN  HYDROmorphone, 1 mg, Intravenous, Q6H PRN  metoclopramide, 10 mg, Intravenous, Q6H PRN  ondansetron, 4 mg, Intravenous, Q6H PRN  oxyCODONE, 5 mg, Oral, Q4H PRN        None    Network Utilization Review Department  ATTENTION: Please call with any questions or concerns to [phone] and carefully listen to the prompts so that you are directed to the right person. All voicemails are confidential.   For Discharge needs, contact Care Management DC Support Team at [phone] opt. [phone]  Send all requests for admission clinical reviews, approved or denied determinations and any other requests to  dedicated fax number below belonging to the campus where the patient is receiving treatment. List of dedicated fax numbers for the Facilities:  FACILITY NAME UR FAX NUMBER   ADMISSION DENIALS (Administrative/Medical Necessity) [phone]   DISCHARGE SUPPORT TEAM (NETWORK) [phone]   PARENT CHILD HEALTH (Maternity/NICU/Pediatrics) [phone]   [facility] [phone]   [facility] [phone]   [facility] [phone]   [facility] [phone]   [facility] [phone]   [facility] [phone]   [facility] [phone]   [facility] [phone]   [facility] [phone]   [facility] [phone]   [facility] [phone]   [facility] [phone]

## 2024-01-27 NOTE — PLAN OF CARE
Problem: Potential for Falls  Goal: Patient will remain free of falls  Description: INTERVENTIONS:  - Educate patient/family on patient safety including physical limitations  - Instruct patient to call for assistance with activity   - Consult OT/PT to assist with strengthening/mobility   - Keep Call bell within reach  - Keep bed low and locked with side rails adjusted as appropriate  - Keep care items and personal belongings within reach  - Initiate and maintain comfort rounds  - Make Fall Risk Sign visible to staff  - Offer Toileting every 2 Hours, in advance of need  - Initiate/Maintain bed alarm  - Obtain necessary fall risk management equipment:   - Apply yellow socks and bracelet for high fall risk patients  - Consider moving patient to room near nurses station  1/26/2024 2038 by Tanya Dunbar RN  Outcome: Progressing  1/26/2024 2038 by Tanya Dunbar RN  Outcome: Progressing     Problem: PAIN - ADULT  Goal: Verbalizes/displays adequate comfort level or baseline comfort level  Description: Interventions:  - Encourage patient to monitor pain and request assistance  - Assess pain using appropriate pain scale  - Administer analgesics based on type and severity of pain and evaluate response  - Implement non-pharmacological measures as appropriate and evaluate response  - Consider cultural and social influences on pain and pain management  - Notify physician/advanced practitioner if interventions unsuccessful or patient reports new pain  1/26/2024 2038 by Tanya Dunbar RN  Outcome: Progressing  1/26/2024 2038 by Tanya Dunbar RN  Outcome: Progressing     Problem: GENITOURINARY - ADULT  Goal: Maintains or returns to baseline urinary function  Description: INTERVENTIONS:  - Assess urinary function  - Encourage oral fluids to ensure adequate hydration if ordered  - Administer IV fluids as ordered to ensure adequate hydration  - Administer ordered medications as needed  - Offer frequent toileting  - Follow urinary  retention protocol if ordered  1/26/2024 2038 by Tanya Dunbar RN  Outcome: Progressing  1/26/2024 2038 by Tanya Dunbar RN  Outcome: Progressing     Problem: METABOLIC, FLUID AND ELECTROLYTES - ADULT  Goal: Electrolytes maintained within normal limits  Description: INTERVENTIONS:  - Monitor labs and assess patient for signs and symptoms of electrolyte imbalances  - Administer electrolyte replacement as ordered  - Monitor response to electrolyte replacements, including repeat lab results as appropriate  - Instruct patient on fluid and nutrition as appropriate  1/26/2024 2038 by Tanya Dunbar RN  Outcome: Progressing  1/26/2024 2038 by Tanya Dunbar RN  Outcome: Progressing

## 2024-01-27 NOTE — NURSING NOTE
Pt verbalized understanding to discharge instructions and medications. Pt left with all belongings via wheelchair accompany by PCA to private vehicle.

## 2024-01-27 NOTE — DISCHARGE SUMMARY
Discharge Summary - Adilia Leos 43 y.o. female MRN: [id number]    Unit/Bed#: E5 -01 Encounter: [id number]    Admission Date:   Admission Orders (From admission, onward)       Ordered        01/26/24 0930  Place in Observation  Once                            Admitting Diagnosis: Kidney stone [N20.0]  Flank pain [R10.9]    HPI: Adilia Leos is a 43-year-old female presenting to the emergency room with chief complaint of left flank pain.  CT scan confirmed left obstructing ureteral calculus.  After next nation of risk versus benefits and potential complications patient was agreeable to proceed with definitive stone surgery.      Procedures Performed: Left-cystoscopy, left retrograde pyelography, left ureteroscopy, lithotripsy holmium laser, and insertion of left ureteral stent    Summary of Hospital Course: Patient was admitted to the service of Dr. Jass Kuo and taken to the operative theater for the above-mentioned procedure.  For surgical details please refer to operative report dictated by surgeon of record.  Patient was observed postoperatively.  She remained afebrile, hemodynamically stable, adequately pain controlled.  Patient was eating and  Voiding without complaints.  Patient was ambulating with minimal to no assistance.  Patient was seen  stable for discharge in the evening of postoperative day 0.        Complications: None    Discharge Diagnosis: Left ureteral calculus    Medical Problems       Resolved Problems  Date Reviewed: 1/27/2024   None         Condition at Discharge: good         Discharge instructions/Information to patient and family:   See after visit summary for information provided to patient and family.      Provisions for Follow-Up Care:  See after visit summary for information related to follow-up care and any pertinent home health orders.      PCP: Jass Lui DO    Disposition: Home    Planned Readmission: No      Discharge Statement   I spent 20 minutes  discharging the patient. This time was spent on the day of discharge. I had direct contact with the patient on the day of discharge. Additional documentation is required if more than 30 minutes were spent on discharge.     Discharge Medications:  See after visit summary for reconciled discharge medications provided to patient and family.

## 2024-01-27 NOTE — DISCHARGE INSTRUCTIONS
Ms. Leos,  You were evaluated in the emergency room due to flank pain.  CT of the abdomen and pelvis demonstrated obstructing ureteral calculus.  You were admitted to the urology service and taken to operative theater 1/27 for cystoscopy, retrograde pyelography, ureteroscopy, laser lithotripsy/stone extraction and insertion of stent.    You will feel urgency, frequency and burning sensation when urinating (dysuria) as well as visualized blood in your urine.  The symptoms are to be expected post urologic procedure.    You have a stent taped to your thigh or the pubic area.  Should be kept in place for approximately 5 days.  You will be contacted with instructions on how to remove your ureteral stent by gently pulling on the string until entire length of the stent is visualized.  This does not require specific urologic skill.    You should complete all medications as prescribe including antibiotics, if applicable.    Should you experience fever with or without chills, intractable flank, abdominal or suprapubic pain not relieved by medications please present to your nearest [facility] emergency room.  Otherwise you will be contacted by our service and follow-up with repeat imaging at an interval.    On behalf of [facility] for u[facility] we wish you a quick and speedy recovery.      [facility]  [address] [place] [address], [address]  [place] [address]

## 2024-01-28 LAB — BACTERIA UR CULT: NORMAL

## 2024-01-29 ENCOUNTER — TELEPHONE (OUTPATIENT)
Dept: UROLOGY | Facility: AMBULATORY SURGERY CENTER | Age: 44
End: 2024-01-29

## 2024-01-29 ENCOUNTER — TELEPHONE (OUTPATIENT)
Dept: FAMILY MEDICINE CLINIC | Facility: CLINIC | Age: 44
End: 2024-01-29

## 2024-01-29 ENCOUNTER — TRANSITIONAL CARE MANAGEMENT (OUTPATIENT)
Dept: FAMILY MEDICINE CLINIC | Facility: CLINIC | Age: 44
End: 2024-01-29

## 2024-01-29 DIAGNOSIS — N20.0 NEPHROLITHIASIS: Primary | ICD-10-CM

## 2024-01-29 NOTE — TELEPHONE ENCOUNTER
She has a left stent in place with a string.  She was instructed to remove her own stent on Tuesday.     Post Op Note    Adilia Leos is a 43 y.o. female s/p cysto L stent performed 01/27/2024.  Adilia Leos is a patient of Dr. Dr. Kuo and is seen at the [facility].     How would you rate your pain on a scale from 1 to 10, 10 being the worst pain ever? 4  Have you had a fever? No  Have your bowel movements been regular? No - suggested stool softner, fruits and vegetable and   Do you have any difficulty urinating? No  Cynthia you have any other questions or concerns that I can address at this time? She will remove stent herself on 1/30 and amish us to verify . Scheduled her to f/u 4/25 @ 8:30 Kendal at [facility]

## 2024-01-30 ENCOUNTER — NURSE TRIAGE (OUTPATIENT)
Age: 44
End: 2024-01-30

## 2024-01-30 NOTE — TELEPHONE ENCOUNTER
"Answer Assessment - Initial Assessment Questions  1. REASON FOR CALL or QUESTION: \"What is your reason for calling today?\" or \"How can I best help you?\" or \"What question do you have that I can help answer?\"      Pt called to let us know she pulled stent out. Also requesting a note to return to work. 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 Please advise.    Protocols used: Information Only Call - No Triage-ADULT-[place]    "

## 2024-01-31 NOTE — TELEPHONE ENCOUNTER
Patient called for update. Advised patient to please allow 48hrs for response.     Patient CB#[phone]

## 2024-02-06 LAB
CA H2 PHOS DIHYD MFR STONE: 40 %
CALCIUM OXALATE DIHYDRATE MFR STONE IR: 50 %
COLOR STONE: NORMAL
COM MFR STONE: 10 %
COMMENT-STONE3: NORMAL
COMPOSITION: NORMAL
LABORATORY COMMENT REPORT: NORMAL
PHOTO: NORMAL
SIZE STONE: NORMAL MM
SPEC SOURCE SUBJ: NORMAL
STONE ANALYSIS-IMP: NORMAL
STONE ANALYSIS-IMP: NORMAL
WT STONE: 61 MG

## 2024-02-07 ENCOUNTER — PATIENT MESSAGE (OUTPATIENT)
Dept: UROLOGY | Facility: MEDICAL CENTER | Age: 44
End: 2024-02-07

## 2024-02-07 DIAGNOSIS — R39.9 UTI SYMPTOMS: Primary | ICD-10-CM

## 2024-02-07 NOTE — PATIENT COMMUNICATION
Pt states still having symptoms. Reports having pain, frequency and urgency, and burning at  end of stream. . Also still passing blood clots. Not taking any medications only have five days worth. Reports last time felt better after 2 days of stent removal. Reports 8 days out and symptoms not improving. Pt reports not having good hydration, denies fever, chills, nausea or vomiting. Pt unsure what to do at this point. Please review and advise

## 2024-02-08 RX ORDER — PHENAZOPYRIDINE HYDROCHLORIDE 100 MG/1
100 TABLET, FILM COATED ORAL 3 TIMES DAILY PRN
Qty: 24 TABLET | Refills: 0 | Status: SHIPPED | OUTPATIENT
Start: 2024-02-08

## 2024-02-09 ENCOUNTER — APPOINTMENT (OUTPATIENT)
Dept: LAB | Age: 44
End: 2024-02-09
Payer: COMMERCIAL

## 2024-02-09 DIAGNOSIS — R39.9 UTI SYMPTOMS: ICD-10-CM

## 2024-02-09 LAB
BACTERIA UR QL AUTO: ABNORMAL /HPF
BILIRUB UR QL STRIP: NEGATIVE
CLARITY UR: CLEAR
COLOR UR: ABNORMAL
GLUCOSE UR STRIP-MCNC: NEGATIVE MG/DL
HGB UR QL STRIP.AUTO: NEGATIVE
KETONES UR STRIP-MCNC: NEGATIVE MG/DL
LEUKOCYTE ESTERASE UR QL STRIP: NEGATIVE
MUCOUS THREADS UR QL AUTO: ABNORMAL
NITRITE UR QL STRIP: NEGATIVE
NON-SQ EPI CELLS URNS QL MICRO: ABNORMAL /HPF
PH UR STRIP.AUTO: 5.5 [PH]
PROT UR STRIP-MCNC: ABNORMAL MG/DL
RBC #/AREA URNS AUTO: ABNORMAL /HPF
SP GR UR STRIP.AUTO: 1.02 (ref 1–1.03)
UROBILINOGEN UR STRIP-ACNC: <2 MG/DL
WBC #/AREA URNS AUTO: ABNORMAL /HPF

## 2024-02-09 PROCEDURE — 81001 URINALYSIS AUTO W/SCOPE: CPT

## 2024-02-09 PROCEDURE — 87086 URINE CULTURE/COLONY COUNT: CPT

## 2024-02-10 LAB — BACTERIA UR CULT: NORMAL

## 2024-02-14 ENCOUNTER — OFFICE VISIT (OUTPATIENT)
Dept: FAMILY MEDICINE CLINIC | Facility: CLINIC | Age: 44
End: 2024-02-14
Payer: COMMERCIAL

## 2024-02-14 ENCOUNTER — TELEPHONE (OUTPATIENT)
Age: 44
End: 2024-02-14

## 2024-02-14 VITALS
HEART RATE: 92 BPM | OXYGEN SATURATION: 98 % | BODY MASS INDEX: 29.02 KG/M2 | SYSTOLIC BLOOD PRESSURE: 119 MMHG | HEIGHT: 64 IN | WEIGHT: 170 LBS | DIASTOLIC BLOOD PRESSURE: 60 MMHG | TEMPERATURE: 95.1 F

## 2024-02-14 DIAGNOSIS — J06.9 URTI (ACUTE UPPER RESPIRATORY INFECTION): Primary | ICD-10-CM

## 2024-02-14 PROCEDURE — 99214 OFFICE O/P EST MOD 30 MIN: CPT | Performed by: FAMILY MEDICINE

## 2024-02-14 RX ORDER — BENZONATATE 200 MG/1
200 CAPSULE ORAL 3 TIMES DAILY PRN
Qty: 30 CAPSULE | Refills: 0 | Status: SHIPPED | OUTPATIENT
Start: 2024-02-14

## 2024-02-14 RX ORDER — AZITHROMYCIN 250 MG/1
TABLET, FILM COATED ORAL
Qty: 6 TABLET | Refills: 0 | Status: SHIPPED | OUTPATIENT
Start: 2024-02-14 | End: 2024-02-19

## 2024-02-14 NOTE — TELEPHONE ENCOUNTER
Patient calling to schedule same day appointments for herself and her two children. Warm transfer in to PCP office for assistance as only two back-to-back same day spots are available. Gillian at  assisted in accommodating three spots so the patients can be seen together as patient's children are minors. Patient thanks us for our assistance!

## 2024-02-14 NOTE — PROGRESS NOTES
Assessment/Plan:      Diagnoses and all orders for this visit:    URTI (acute upper respiratory infection)  -     azithromycin (ZITHROMAX) 250 mg tablet; Take 2 tablets today then 1 tablet daily x 4 days  -     benzonatate (TESSALON) 200 MG capsule; Take 1 capsule (200 mg total) by mouth 3 (three) times a day as needed for cough (cough)     Recommend supportive care fluids and rest.  Follow-up if not a lot better in 5 days.    Subjective:     Patient ID: Adilia Leos is a 43 y.o. female.    Patient presents with:  Cough: Pt states she has a bad cough ear pain stuffy nose no other problems or concerns zoe         Cough  This is a new problem. The current episode started yesterday. The problem occurs constantly. The cough is Productive of sputum. Associated symptoms include postnasal drip, rhinorrhea and a sore throat. Pertinent negatives include no fever.       Review of Systems   Constitutional:  Negative for activity change, fatigue and fever.   HENT:  Positive for congestion, postnasal drip, rhinorrhea and sore throat.    Respiratory:  Positive for cough.    Cardiovascular: Negative.    Gastrointestinal: Negative.          Objective:     Physical Exam  Vitals and nursing note reviewed.   Constitutional:       Appearance: Normal appearance. She is well-developed.   HENT:      Head: Normocephalic and atraumatic.      Nose: Congestion and rhinorrhea present.      Mouth/Throat:      Pharynx: Oropharyngeal exudate and posterior oropharyngeal erythema present.   Eyes:      Pupils: Pupils are equal, round, and reactive to light.   Neck:      Vascular: No JVD.   Cardiovascular:      Rate and Rhythm: Normal rate and regular rhythm.      Pulses: Normal pulses.      Heart sounds: Normal heart sounds.   Pulmonary:      Effort: Pulmonary effort is normal.   Abdominal:      Palpations: Abdomen is soft.   Musculoskeletal:      Cervical back: Normal range of motion.   Lymphadenopathy:      Cervical: No cervical adenopathy.    Neurological:      General: No focal deficit present.      Mental Status: She is alert and oriented to person, place, and time.

## 2024-04-03 ENCOUNTER — HOSPITAL ENCOUNTER (OUTPATIENT)
Dept: ULTRASOUND IMAGING | Facility: HOSPITAL | Age: 44
Discharge: HOME | End: 2024-04-03
Payer: COMMERCIAL

## 2024-04-03 DIAGNOSIS — N20.0 NEPHROLITHIASIS: ICD-10-CM

## 2024-04-03 PROCEDURE — 76775 US EXAM ABDO BACK WALL LIM: CPT

## 2024-04-25 ENCOUNTER — OFFICE VISIT (OUTPATIENT)
Dept: UROLOGY | Facility: CLINIC | Age: 44
End: 2024-04-25
Payer: COMMERCIAL

## 2024-04-25 VITALS
SYSTOLIC BLOOD PRESSURE: 110 MMHG | HEIGHT: 64 IN | DIASTOLIC BLOOD PRESSURE: 70 MMHG | RESPIRATION RATE: 16 BRPM | WEIGHT: 170 LBS | HEART RATE: 73 BPM | OXYGEN SATURATION: 98 % | BODY MASS INDEX: 29.02 KG/M2

## 2024-04-25 DIAGNOSIS — N20.0 NEPHROLITHIASIS: Primary | ICD-10-CM

## 2024-04-25 DIAGNOSIS — N39.3 STRESS INCONTINENCE: ICD-10-CM

## 2024-04-25 PROCEDURE — 99213 OFFICE O/P EST LOW 20 MIN: CPT | Performed by: PHYSICIAN ASSISTANT

## 2024-04-25 RX ORDER — POTASSIUM CITRATE 15 MEQ/1
1 TABLET, EXTENDED RELEASE ORAL 2 TIMES DAILY
Qty: 60 TABLET | Refills: 6 | Status: SHIPPED | OUTPATIENT
Start: 2024-04-25

## 2024-04-25 NOTE — PROGRESS NOTES
4/25/2024      Chief Complaint   Patient presents with   • Follow-up     US on 4/3/24         Assessment and Plan    43 y.o. female managed by Dr Kuo    1. Nephrolithiasis  -     Potassium Citrate ER 15 MEQ (1620 MG) TBCR; Take 1 tablet by mouth 2 (two) times a day  -     Basic metabolic panel; Future; Expected date: 05/25/2024    2. Stress incontinence  -     Ambulatory Referral to Physical Therapy; Future      Currently asymptomatic, well recovered from her stone surgery.  This did unfortunately exacerbate her stress incontinence.  I recommend a series of pelvic floor physical therapy sessions for that.  For stone prevention she will continue her hydration goals upwards of  ounces of water daily, I have added potassium citrate 15 mEq twice daily based on her previous 24-hour urine results, calcium oxalate and brushite stones. BMP in 1 months, US in 1 year.      History of Present Illness  Adilia Leos is a 43 y.o. female here for evaluation of 3 months postop stone surgery-obstructing mid left ureteral calculus treated with ureteroscopy laser lithotripsy in addition to other left lower pole stones.  Stent removed few days later.  Stone analysis is calcium oxalate and brushite. She supplements with dietary citrate.  Presents today with updated ultrasound, all hydronephrosis resolved, ureteral jets present, a 6 mm fragment in the left lower pole is seen.  Family history both grandparents had stones.  She recalls her grandmother being out of medication for stone prevention and questions if this might be right for her. She reports stress incontinence few years with rare occasions, worse in the past few months, now with 1 pad per day, seems to be improving some to every other day episodes but right after surgery was every day.        Review of Systems   Constitutional: Negative.    Respiratory: Negative.     Cardiovascular: Negative.    Genitourinary:  Negative for decreased urine volume, difficulty  "urinating, dysuria, flank pain, frequency, hematuria, pelvic pain and urgency.   Musculoskeletal: Negative.                 Vitals  Vitals:    04/25/24 0820   BP: 110/70   BP Location: Left arm   Patient Position: Sitting   Cuff Size: Adult   Pulse: 73   Resp: 16   SpO2: 98%   Weight: 77.1 kg (170 lb)   Height: 5' 4\" (1.626 m)       Physical Exam  Vitals and nursing note reviewed.   Constitutional:       General: She is not in acute distress.     Appearance: She is well-developed. She is not diaphoretic.   HENT:      Head: Normocephalic and atraumatic.   Pulmonary:      Effort: Pulmonary effort is normal.   Skin:     General: Skin is warm and dry.   Neurological:      Mental Status: She is alert and oriented to person, place, and time.      Gait: Gait normal.   Psychiatric:         Speech: Speech normal.         Behavior: Behavior normal.           Past History  Past Medical History:   Diagnosis Date   • Abdominal pain    • Acute deep vein thrombosis (DVT) of left lower extremity (HCC)    • Depression    • Distorted vision    • Feeling of incomplete bladder emptying    • Kidney stone    • Nocturia    • Otitis media    • Stress incontinence of urine    • Substance abuse (HCC)    • Urinary frequency    • Urinary tract infection    • Visual impairment    • Weak urinary stream      Social History     Socioeconomic History   • Marital status: /Civil Union     Spouse name: PATRICIO JONES   • Number of children: 2   • Years of education: None   • Highest education level: None   Occupational History   • Occupation: Retail     Comment: Part-time   Tobacco Use   • Smoking status: Every Day     Current packs/day: 1.00     Types: Cigarettes   • Smokeless tobacco: Never   Vaping Use   • Vaping status: Never Used   Substance and Sexual Activity   • Alcohol use: No   • Drug use: No   • Sexual activity: Yes     Partners: Male     Comment: -PATRICIO JONES   Other Topics Concern   • None   Social History Narrative   • None "     Social Determinants of Health     Financial Resource Strain: Low Risk  (6/8/2021)    Overall Financial Resource Strain (CARDIA)    • Difficulty of Paying Living Expenses: Not hard at all   Food Insecurity: No Food Insecurity (6/8/2021)    Hunger Vital Sign    • Worried About Running Out of Food in the Last Year: Never true    • Ran Out of Food in the Last Year: Never true   Transportation Needs: No Transportation Needs (6/8/2021)    PRAPARE - Transportation    • Lack of Transportation (Medical): No    • Lack of Transportation (Non-Medical): No   Physical Activity: Insufficiently Active (6/8/2021)    Exercise Vital Sign    • Days of Exercise per Week: 3 days    • Minutes of Exercise per Session: 30 min   Stress: No Stress Concern Present (6/8/2021)    [place] [employer] - Occupational Stress Questionnaire    • Feeling of Stress : Only a little   Social Connections: Unknown (6/8/2021)    Social Connection and Isolation Panel [NHANES]    • Frequency of Communication with Friends and Family: More than three times a week    • Frequency of Social Gatherings with Friends and Family: More than three times a week    • Attends Yazdanism Services: Not on file    • Active Member of Clubs or Organizations: No    • Attends Club or Organization Meetings: Never    • Marital Status:    Intimate Partner Violence: Not At Risk (6/8/2021)    Humiliation, Afraid, Rape, and Kick questionnaire    • Fear of Current or Ex-Partner: No    • Emotionally Abused: No    • Physically Abused: No    • Sexually Abused: No   Housing Stability: Not on file     Social History     Tobacco Use   Smoking Status Every Day   • Current packs/day: 1.00   • Types: Cigarettes   Smokeless Tobacco Never     Family History   Problem Relation Age of Onset   • Cancer Family    • Heart attack Family    • Nephrolithiasis Family    • Nephrolithiasis Family        The following portions of the patient's history were reviewed and updated as  "appropriate: allergies, current medications, past medical history, past social history, past surgical history and problem list.    Results  No results found for this or any previous visit (from the past 1 hour(s)).]  No results found for: \"PSA\"  Lab Results   Component Value Date    CALCIUM 8.4 01/27/2024    K 4.1 01/27/2024    CO2 23 01/27/2024     (H) 01/27/2024    BUN 21 01/27/2024    CREATININE 0.87 01/27/2024     Lab Results   Component Value Date    WBC 8.94 01/27/2024    HGB 11.2 (L) 01/27/2024    HCT 33.9 (L) 01/27/2024    MCV 92 01/27/2024     01/27/2024       "

## 2024-12-27 NOTE — TELEPHONE ENCOUNTER
Plan of care reviewed with patient. Retacrit Q4W for Hgb 10.4. Appts reviewed and printed for patient.   Pt scheduled for STAT CT scan at [facility]  today,  8/9/21, at 4:30 PM     Needs auth?

## 2025-03-06 ENCOUNTER — OFFICE VISIT (OUTPATIENT)
Age: 45
End: 2025-03-06
Payer: COMMERCIAL

## 2025-03-06 VITALS
BODY MASS INDEX: 29.96 KG/M2 | HEART RATE: 96 BPM | DIASTOLIC BLOOD PRESSURE: 76 MMHG | WEIGHT: 179.8 LBS | SYSTOLIC BLOOD PRESSURE: 126 MMHG | TEMPERATURE: 97.9 F | HEIGHT: 65 IN

## 2025-03-06 DIAGNOSIS — R09.81 SINUS CONGESTION: ICD-10-CM

## 2025-03-06 DIAGNOSIS — J06.9 VIRAL URI WITH COUGH: Primary | ICD-10-CM

## 2025-03-06 PROCEDURE — 99214 OFFICE O/P EST MOD 30 MIN: CPT | Performed by: STUDENT IN AN ORGANIZED HEALTH CARE EDUCATION/TRAINING PROGRAM

## 2025-03-06 PROCEDURE — 87636 SARSCOV2 & INF A&B AMP PRB: CPT | Performed by: STUDENT IN AN ORGANIZED HEALTH CARE EDUCATION/TRAINING PROGRAM

## 2025-03-06 RX ORDER — FLUTICASONE PROPIONATE 50 MCG
1 SPRAY, SUSPENSION (ML) NASAL DAILY
Qty: 16 G | Refills: 0 | Status: SHIPPED | OUTPATIENT
Start: 2025-03-06

## 2025-03-06 NOTE — PROGRESS NOTES
"Name: Adilia Leos      : 1980      MRN: [id number]  Encounter Provider: Lyndon High MD  Encounter Date: 3/6/2025   Encounter department: [facility]  :  Assessment & Plan  Viral URI with cough    Orders:  •  Covid/Flu- Office Collect Normal; Future  COVID/flu test ordered at today's visit, will follow-up results with patient.  Recommended supportive care at this time, including rest, plenty of hydration, and over-the-counter medicine as needed.  Advised patient to follow-up with office if symptoms worsen or fail to improve.  Sinus congestion    Orders:  •  fluticasone (FLONASE) 50 mcg/act nasal spray; 1 spray into each nostril daily  Prescription for Flonase sent to pharmacy for symptomatic relief.  Advised patient to follow-up with office if symptoms worsen or fail to improve.         History of Present Illness   URI   This is a new problem. The problem has been gradually worsening. There has been no fever. Associated symptoms include congestion, coughing, rhinorrhea and a sore throat. Pertinent negatives include no abdominal pain, diarrhea, headaches, nausea, sneezing or vomiting. She has tried decongestant and NSAIDs for the symptoms. The treatment provided moderate relief.     Review of Systems   HENT:  Positive for congestion, rhinorrhea and sore throat. Negative for sneezing.    Respiratory:  Positive for cough.    Gastrointestinal:  Negative for abdominal pain, diarrhea, nausea and vomiting.   Neurological:  Negative for headaches.       Objective   /76 (BP Location: Left arm, Patient Position: Sitting, Cuff Size: Large)   Pulse 96   Temp 97.9 °F (36.6 °C) (Temporal)   Ht 5' 5\" (1.651 m)   Wt 81.6 kg (179 lb 12.8 oz)   BMI 29.92 kg/m²      Physical Exam  Constitutional:       General: She is not in acute distress.     Appearance: Normal appearance. She is not ill-appearing.   HENT:      Head: Normocephalic and atraumatic.      Right Ear: Ear canal " normal. There is no impacted cerumen. Tympanic membrane is bulging.      Left Ear: Tympanic membrane and ear canal normal. There is no impacted cerumen.      Nose: Nose normal. No congestion.      Mouth/Throat:      Mouth: Mucous membranes are moist.      Pharynx: Oropharynx is clear. Posterior oropharyngeal erythema present. No oropharyngeal exudate.   Eyes:      General:         Right eye: No discharge.         Left eye: No discharge.      Conjunctiva/sclera: Conjunctivae normal.      Pupils: Pupils are equal, round, and reactive to light.   Cardiovascular:      Rate and Rhythm: Normal rate and regular rhythm.      Heart sounds: Normal heart sounds. No murmur heard.  Pulmonary:      Effort: Pulmonary effort is normal. No respiratory distress.      Breath sounds: Normal breath sounds.   Abdominal:      Palpations: Abdomen is soft.      Tenderness: There is no abdominal tenderness.   Musculoskeletal:      Right lower leg: No edema.      Left lower leg: No edema.   Neurological:      Mental Status: She is alert.

## 2025-03-07 ENCOUNTER — RESULTS FOLLOW-UP (OUTPATIENT)
Age: 45
End: 2025-03-07

## 2025-03-07 DIAGNOSIS — J01.90 ACUTE SINUSITIS, RECURRENCE NOT SPECIFIED, UNSPECIFIED LOCATION: Primary | ICD-10-CM

## 2025-03-07 LAB
FLUAV RNA RESP QL NAA+PROBE: NEGATIVE
FLUBV RNA RESP QL NAA+PROBE: NEGATIVE
SARS-COV-2 RNA RESP QL NAA+PROBE: NEGATIVE

## (undated) DEVICE — CATH URETERAL 5FR X 70 CM FLEX TIP POLYUR BARD

## (undated) DEVICE — GLOVE INDICATOR PI UNDERGLOVE SZ 8 BLUE

## (undated) DEVICE — TELFA NON-ADHERENT ABSORBENT DRESSING: Brand: TELFA

## (undated) DEVICE — SCD SEQUENTIAL COMPRESSION COMFORT SLEEVE MEDIUM KNEE LENGTH: Brand: KENDALL SCD

## (undated) DEVICE — INVIEW CLEAR LEGGINGS: Brand: CONVERTORS

## (undated) DEVICE — 3M™ STERI-STRIP™ COMPOUND BENZOIN TINCTURE 40 BAGS/CARTON 4 CARTONS/CASE C1544: Brand: 3M™ STERI-STRIP™

## (undated) DEVICE — TUBING SUCTION 5MM X 12 FT

## (undated) DEVICE — PACK TUR

## (undated) DEVICE — GLOVE SRG BIOGEL 7.5

## (undated) DEVICE — LUBRICANT SURGILUBE TUBE 4 OZ  FLIP TOP

## (undated) DEVICE — ENDOSCOPIC VALVE WITH ADAPTER.: Brand: SURSEAL® II

## (undated) DEVICE — GUIDEWIRE STRGHT TIP 0.035 IN  SOLO PLUS

## (undated) DEVICE — BASKET SPEC RETRVL 3FR 90CM SS

## (undated) DEVICE — GLOVE SRG BIOGEL 8

## (undated) DEVICE — BASKET SPECIMEN RETRIVAL 1.9FR 120CM

## (undated) DEVICE — UROCATCH BAG

## (undated) DEVICE — SPECIMEN CONTAINER STERILE PEEL PACK

## (undated) DEVICE — NEEDLE BLUNT 18 G X 1 1/2IN

## (undated) DEVICE — TRANSPOSAL ULTRAFLEX DUO/QUAD ULTRA CART MANIFOLD

## (undated) DEVICE — 3000CC GUARDIAN II: Brand: GUARDIAN

## (undated) DEVICE — STERILE POLYISOPRENE POWDER-FREE SURGICAL GLOVES: Brand: PROTEXIS

## (undated) DEVICE — 4-PORT MANIFOLD: Brand: NEPTUNE 2

## (undated) DEVICE — FIBER STD QUANTA 365 MICRON

## (undated) DEVICE — UROLOGIC DRAIN BAG: Brand: UNBRANDED

## (undated) DEVICE — GLOVE PI ULTRA TOUCH SZ.6.5

## (undated) DEVICE — SHEATH URETERAL ACCESS 12/14FR 35CM PROXIS